# Patient Record
Sex: FEMALE | Race: WHITE | NOT HISPANIC OR LATINO | URBAN - METROPOLITAN AREA
[De-identification: names, ages, dates, MRNs, and addresses within clinical notes are randomized per-mention and may not be internally consistent; named-entity substitution may affect disease eponyms.]

---

## 2019-07-18 ENCOUNTER — EMERGENCY (EMERGENCY)
Facility: HOSPITAL | Age: 34
LOS: 1 days | Discharge: OTHER INSTITUTION | End: 2019-07-18
Attending: EMERGENCY MEDICINE
Payer: MEDICARE

## 2019-07-18 VITALS
HEART RATE: 72 BPM | RESPIRATION RATE: 18 BRPM | WEIGHT: 125 LBS | TEMPERATURE: 98 F | OXYGEN SATURATION: 98 % | HEIGHT: 66 IN | SYSTOLIC BLOOD PRESSURE: 105 MMHG | DIASTOLIC BLOOD PRESSURE: 68 MMHG

## 2019-07-18 LAB
ALBUMIN SERPL ELPH-MCNC: 4.5 G/DL — SIGNIFICANT CHANGE UP (ref 3.3–5.2)
ALP SERPL-CCNC: 54 U/L — SIGNIFICANT CHANGE UP (ref 40–120)
ALT FLD-CCNC: 15 U/L — SIGNIFICANT CHANGE UP
ANION GAP SERPL CALC-SCNC: 8 MMOL/L — SIGNIFICANT CHANGE UP (ref 5–17)
APAP SERPL-MCNC: <7.5 UG/ML — LOW (ref 10–26)
AST SERPL-CCNC: 25 U/L — SIGNIFICANT CHANGE UP
BASOPHILS # BLD AUTO: 0.04 K/UL — SIGNIFICANT CHANGE UP (ref 0–0.2)
BASOPHILS NFR BLD AUTO: 0.5 % — SIGNIFICANT CHANGE UP (ref 0–2)
BILIRUB SERPL-MCNC: 0.2 MG/DL — LOW (ref 0.4–2)
BUN SERPL-MCNC: 16 MG/DL — SIGNIFICANT CHANGE UP (ref 8–20)
CALCIUM SERPL-MCNC: 9.1 MG/DL — SIGNIFICANT CHANGE UP (ref 8.6–10.2)
CHLORIDE SERPL-SCNC: 106 MMOL/L — SIGNIFICANT CHANGE UP (ref 98–107)
CO2 SERPL-SCNC: 26 MMOL/L — SIGNIFICANT CHANGE UP (ref 22–29)
CREAT SERPL-MCNC: 0.72 MG/DL — SIGNIFICANT CHANGE UP (ref 0.5–1.3)
EOSINOPHIL # BLD AUTO: 0.07 K/UL — SIGNIFICANT CHANGE UP (ref 0–0.5)
EOSINOPHIL NFR BLD AUTO: 0.8 % — SIGNIFICANT CHANGE UP (ref 0–6)
ETHANOL SERPL-MCNC: <10 MG/DL — SIGNIFICANT CHANGE UP
GLUCOSE SERPL-MCNC: 82 MG/DL — SIGNIFICANT CHANGE UP (ref 70–115)
HCT VFR BLD CALC: 33.3 % — LOW (ref 34.5–45)
HGB BLD-MCNC: 10.5 G/DL — LOW (ref 11.5–15.5)
IMM GRANULOCYTES NFR BLD AUTO: 0.6 % — SIGNIFICANT CHANGE UP (ref 0–1.5)
LYMPHOCYTES # BLD AUTO: 1.01 K/UL — SIGNIFICANT CHANGE UP (ref 1–3.3)
LYMPHOCYTES # BLD AUTO: 12 % — LOW (ref 13–44)
MCHC RBC-ENTMCNC: 30.3 PG — SIGNIFICANT CHANGE UP (ref 27–34)
MCHC RBC-ENTMCNC: 31.5 GM/DL — LOW (ref 32–36)
MCV RBC AUTO: 96.2 FL — SIGNIFICANT CHANGE UP (ref 80–100)
MONOCYTES # BLD AUTO: 0.61 K/UL — SIGNIFICANT CHANGE UP (ref 0–0.9)
MONOCYTES NFR BLD AUTO: 7.2 % — SIGNIFICANT CHANGE UP (ref 2–14)
NEUTROPHILS # BLD AUTO: 6.64 K/UL — SIGNIFICANT CHANGE UP (ref 1.8–7.4)
NEUTROPHILS NFR BLD AUTO: 78.9 % — HIGH (ref 43–77)
PLATELET # BLD AUTO: 206 K/UL — SIGNIFICANT CHANGE UP (ref 150–400)
POTASSIUM SERPL-MCNC: 4.4 MMOL/L — SIGNIFICANT CHANGE UP (ref 3.5–5.3)
POTASSIUM SERPL-SCNC: 4.4 MMOL/L — SIGNIFICANT CHANGE UP (ref 3.5–5.3)
PROT SERPL-MCNC: 7 G/DL — SIGNIFICANT CHANGE UP (ref 6.6–8.7)
RBC # BLD: 3.46 M/UL — LOW (ref 3.8–5.2)
RBC # FLD: 11.9 % — SIGNIFICANT CHANGE UP (ref 10.3–14.5)
SALICYLATES SERPL-MCNC: <0.6 MG/DL — LOW (ref 10–20)
SODIUM SERPL-SCNC: 140 MMOL/L — SIGNIFICANT CHANGE UP (ref 135–145)
TSH SERPL-MCNC: 0.71 UIU/ML — SIGNIFICANT CHANGE UP (ref 0.27–4.2)
WBC # BLD: 8.42 K/UL — SIGNIFICANT CHANGE UP (ref 3.8–10.5)
WBC # FLD AUTO: 8.42 K/UL — SIGNIFICANT CHANGE UP (ref 3.8–10.5)

## 2019-07-18 PROCEDURE — 93010 ELECTROCARDIOGRAM REPORT: CPT

## 2019-07-18 PROCEDURE — 99285 EMERGENCY DEPT VISIT HI MDM: CPT

## 2019-07-18 NOTE — ED PROVIDER NOTE - CHPI ED SYMPTOMS NEG
no hematuria/no chills/no fever/chest pain, urinary symptoms, vaginal bleeding, vaginal discharge/no vomiting
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
## 2019-07-18 NOTE — ED PROVIDER NOTE - CLINICAL SUMMARY MEDICAL DECISION MAKING FREE TEXT BOX
34 y/o F 32 y/o F on Seroquel, Xanax, Ambien, Pain management opiate, hx of chronic constipation with recent behavioral changes at home. EMS activated by family, plan to get Psych Consult, labs, reevaluate.

## 2019-07-18 NOTE — ED PROVIDER NOTE - PHYSICAL EXAMINATION
Gen: NAD, AOx3  Head: NCAT  HEENT: PERRL, EOMI, oral mucosa moist, normal conjunctiva, neck supple  Lung: CTAB, no respiratory distress  CV: rrr, no murmur, Normal perfusion  Abd: soft, NTND, no CVA tenderness  MSK: No edema, no visible deformities  Neuro: No focal neurologic deficits  Skin: No rash   Psych: normal affect Constitutional : Appears comfortably, talking in full sentences, pale appearing. Pt is moving swiftly on stretcher, does not appear in any distress or pain.  Head :NC AT , no swelling  Eyes :eomi, no swelling  Mouth :mm dry,  Neck : supple, trachea in midline  Chest :Braydon air entry, symm chest expansion, no distress  Heart :S1 S2 distant  Abdomen :abd soft, non tender.   Lower back surgical scars, old and healed  Musc/Skel :ext no swelling, no deformity, no spine tenderness, distal pulses present  Neuro  :AAO 3 no focal deficits  Psych: Makes eye contact, follows command, no SI/HI, appears emotionally upset
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
## 2019-07-18 NOTE — ED PROVIDER NOTE - NS ED ROS FT
no weight change, no fever or chills  no recent travel, no recent abox, no sick contacts  no recent change in medications  no rash, no bruises  no visual changes no eye discharge  no cough cold or congestion,   no sob, no chest pain  follows with cardiology  no stress test, no cath  no orthopnea, no pnd  no abd pain, no n/v/d  no endoscopy, no colonoscopy  no hematuria, no change in urinary habits  no joint pain, no deformity  no headache, no paresthesia no weight change, no fever or chills  no recent travel, no recent abox, no sick contacts  + recent change in medications  no rash, no bruises  no visual changes no eye discharge  no cough cold or congestion,   no sob, no chest pain  no orthopnea, no pnd  no abd pain, +n/d, no v  no hematuria, no change in urinary habits  no joint pain, no deformity  no headache, no paresthesia

## 2019-07-19 VITALS
HEART RATE: 96 BPM | RESPIRATION RATE: 18 BRPM | DIASTOLIC BLOOD PRESSURE: 51 MMHG | SYSTOLIC BLOOD PRESSURE: 96 MMHG | TEMPERATURE: 98 F | OXYGEN SATURATION: 100 %

## 2019-07-19 DIAGNOSIS — F60.3 BORDERLINE PERSONALITY DISORDER: ICD-10-CM

## 2019-07-19 DIAGNOSIS — F32.9 MAJOR DEPRESSIVE DISORDER, SINGLE EPISODE, UNSPECIFIED: ICD-10-CM

## 2019-07-19 LAB
AMPHET UR-MCNC: NEGATIVE — SIGNIFICANT CHANGE UP
APPEARANCE UR: ABNORMAL
BACTERIA # UR AUTO: ABNORMAL
BARBITURATES UR SCN-MCNC: NEGATIVE — SIGNIFICANT CHANGE UP
BENZODIAZ UR-MCNC: POSITIVE
BILIRUB UR-MCNC: NEGATIVE — SIGNIFICANT CHANGE UP
COCAINE METAB.OTHER UR-MCNC: NEGATIVE — SIGNIFICANT CHANGE UP
COLOR SPEC: YELLOW — SIGNIFICANT CHANGE UP
COMMENT - URINE: SIGNIFICANT CHANGE UP
DIFF PNL FLD: NEGATIVE — SIGNIFICANT CHANGE UP
EPI CELLS # UR: SIGNIFICANT CHANGE UP
GLUCOSE UR QL: NEGATIVE MG/DL — SIGNIFICANT CHANGE UP
KETONES UR-MCNC: NEGATIVE — SIGNIFICANT CHANGE UP
LEUKOCYTE ESTERASE UR-ACNC: NEGATIVE — SIGNIFICANT CHANGE UP
METHADONE UR-MCNC: NEGATIVE — SIGNIFICANT CHANGE UP
NITRITE UR-MCNC: NEGATIVE — SIGNIFICANT CHANGE UP
OPIATES UR-MCNC: POSITIVE
PCP SPEC-MCNC: SIGNIFICANT CHANGE UP
PCP UR-MCNC: NEGATIVE — SIGNIFICANT CHANGE UP
PH UR: 8 — SIGNIFICANT CHANGE UP (ref 5–8)
PROT UR-MCNC: NEGATIVE MG/DL — SIGNIFICANT CHANGE UP
RBC CASTS # UR COMP ASSIST: SIGNIFICANT CHANGE UP /HPF (ref 0–4)
SP GR SPEC: 1.01 — SIGNIFICANT CHANGE UP (ref 1.01–1.02)
THC UR QL: POSITIVE
UROBILINOGEN FLD QL: NEGATIVE MG/DL — SIGNIFICANT CHANGE UP
WBC UR QL: SIGNIFICANT CHANGE UP

## 2019-07-19 PROCEDURE — 81001 URINALYSIS AUTO W/SCOPE: CPT

## 2019-07-19 PROCEDURE — 84443 ASSAY THYROID STIM HORMONE: CPT

## 2019-07-19 PROCEDURE — 36415 COLL VENOUS BLD VENIPUNCTURE: CPT

## 2019-07-19 PROCEDURE — 85027 COMPLETE CBC AUTOMATED: CPT

## 2019-07-19 PROCEDURE — 90792 PSYCH DIAG EVAL W/MED SRVCS: CPT | Mod: GT

## 2019-07-19 PROCEDURE — 80053 COMPREHEN METABOLIC PANEL: CPT

## 2019-07-19 PROCEDURE — 99285 EMERGENCY DEPT VISIT HI MDM: CPT

## 2019-07-19 PROCEDURE — 80307 DRUG TEST PRSMV CHEM ANLYZR: CPT

## 2019-07-19 PROCEDURE — 93005 ELECTROCARDIOGRAM TRACING: CPT

## 2019-07-19 RX ORDER — HYDROMORPHONE HYDROCHLORIDE 2 MG/ML
4 INJECTION INTRAMUSCULAR; INTRAVENOUS; SUBCUTANEOUS ONCE
Refills: 0 | Status: DISCONTINUED | OUTPATIENT
Start: 2019-07-19 | End: 2019-07-19

## 2019-07-19 RX ORDER — QUETIAPINE FUMARATE 200 MG/1
300 TABLET, FILM COATED ORAL ONCE
Refills: 0 | Status: COMPLETED | OUTPATIENT
Start: 2019-07-19 | End: 2019-07-19

## 2019-07-19 RX ADMIN — QUETIAPINE FUMARATE 300 MILLIGRAM(S): 200 TABLET, FILM COATED ORAL at 05:40

## 2019-07-19 RX ADMIN — HYDROMORPHONE HYDROCHLORIDE 4 MILLIGRAM(S): 2 INJECTION INTRAMUSCULAR; INTRAVENOUS; SUBCUTANEOUS at 10:45
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
## 2019-07-19 NOTE — CHART NOTE - NSCHARTNOTEFT_GEN_A_CORE
Social work note -  attempted to obtain auth from [employer] insurance as listed on pt's EMR. Worker spoke to Brenda who states pt's primary insurance is Medicare and we do not need to obtain auth. Information was given to SOFRANCISCO JAVIER.
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
## 2019-07-19 NOTE — ED BEHAVIORAL HEALTH ASSESSMENT NOTE - CURRENT MEDICATION
Per pt; Rx of Seroquel 600MG qhs, but pt has only been taking 300-400MG instead for several months because felt it was too sedating. Xanax 0.5MG BID, Ambien 10MG qhs, MS Contin 100MG BID, Roxicodone 30MG TID, Dilaudid 8MG BID, Muscle relaxers; Xaneplan 8MG PRN, Baclofen 10MG PRN, Topomate 25MG, release 500MG? or 800MG??, Medical marijuana, zofran 8MG PRN, Lactulose, Ralstor.  Istop#[id number]; zolpidem tartrate 10 mg tablet, alprazolam 0.5 mg tablet BID, blue hybrid ef90 9mg thc and <0.5mg cbd per 0.01ml vape, morphine sulf er 100 mg tablet BID, roxicodone 30 mg tablet TID, hydromorphone 8 mg tablet BID.
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
## 2019-07-19 NOTE — ED BEHAVIORAL HEALTH ASSESSMENT NOTE - PSYCHIATRIC ISSUES AND PLAN (INCLUDE STANDING AND PRN MEDICATION)
can continue home psych medications. can consider following PRN; Offer PO first, Haldol 2MG PO q6h PRN agitation, Ativan 1MG PRN agitation, Benadryl 50MG PRN EPS prevention. Can utilize IM if benefit outweigh risks and pt refuses PO. Lower dose given hx of EPS.

## 2019-07-19 NOTE — ED BEHAVIORAL HEALTH NOTE - BEHAVIORAL HEALTH NOTE
Social Work Note: This writer confirmed acceptance at [facility], spoke with Reuben in Admissions. This writer scheduled ambulance with [facility] for transfer.

## 2019-07-19 NOTE — ED ADULT NURSE REASSESSMENT NOTE - NS ED NURSE REASSESS COMMENT FT1
Handoff report given to RN Amanda in .  Pt escorted to  by [facility].
legal papers signed by ,keylaxed to tele. nursing supervisor was notify signature needed . telepysch call informed  awaiting nursing supervisor.
marcelo completed pt c/o pain requesting medication spoke with dr taveras no orders received at this time
pt upset sitting in day room
supervisor recalled for signature on legal paper awaiting arrival
telepysch in progress
teletia called  requesting supervisor document made aware still awaiting supervisor for signature.   nsg supervisor called will be down shortly to sign
oriented to unit and surroundings made comfortable plan of care explained

## 2019-07-19 NOTE — ED BEHAVIORAL HEALTH ASSESSMENT NOTE - LEGAL HISTORY
court case still open since december, per pt due to medical marijuana. 10 years ago due to DWAI because of meds
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
## 2019-07-19 NOTE — ED BEHAVIORAL HEALTH ASSESSMENT NOTE - AFFECT QUALITY
Patient was not available for their therapy session at this time.  Reason not seen: Patient requesting no therapy today (09/26/18 1208). Pt stated, \"I'm trying to catch up on sleep. I've also been vomiting and I'm trying to fight that off.\" Re-Attempt Plan: Will re-attempt per established treatment plan (09/26/18 1208).     Anxious/Irritable
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
## 2019-07-19 NOTE — ED BEHAVIORAL HEALTH ASSESSMENT NOTE - MEDICAL ISSUES AND PLAN (INCLUDE STANDING AND PRN MEDICATION)
defer to ER doctor; please rule out any concerning organic reasons for loss of bowel control and give recommendations regarding medical medications defer to ER doctor

## 2019-07-19 NOTE — ED BEHAVIORAL HEALTH ASSESSMENT NOTE - PAST PSYCHOTROPIC MEDICATION
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   current pharmacy is [place] at [facility].
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
## 2019-07-19 NOTE — ED BEHAVIORAL HEALTH ASSESSMENT NOTE - AXIS IV
Housing problems/Economic problems/Problem related to social environment/Educational problems/Occupational problems/Problems with primary support/Problems with interaction with legal system
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
## 2019-07-19 NOTE — ED ADULT NURSE REASSESSMENT NOTE - CONDITION
unchanged
unchanged/Pt asleep on initial rounds. Refused breakfast. provided a list of her medications and stated her pharmacy was [facility] in [place]. Unable to verify meds she provided. Call to Dr. Taylor. Dilauded 4mg given. Pt then stated [facility] was closed and she received meds from Pharmacy in [facility].. Meds verified but different dosage. . Pt continued to rest in bed comfortably. Reports pain as 8/10
unchanged
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
## 2019-07-19 NOTE — ED BEHAVIORAL HEALTH ASSESSMENT NOTE - OTHER PAST PSYCHIATRIC HISTORY (INCLUDE DETAILS REGARDING ONSET, COURSE OF ILLNESS, INPATIENT/OUTPATIENT TREATMENT)
see HPI.   Dx BPD. 5-6 hospitalizations for behavioral issues. ER visits, last August 2018 at [place].   In outpatient with psychiatrist Dr. Willy Mcgee and therapist Edenilson Lu.  Attempted to call Dr. Mcgee for collateral-goes to voicemail, [facility] left voicemail.

## 2021-01-25 PROBLEM — F60.3 BORDERLINE PERSONALITY DISORDER: Chronic | Status: ACTIVE | Noted: 2019-07-26

## 2021-03-16 ENCOUNTER — APPOINTMENT (OUTPATIENT)
Dept: GASTROENTEROLOGY | Facility: CLINIC | Age: 36
End: 2021-03-16

## 2021-04-30 NOTE — ED BEHAVIORAL HEALTH ASSESSMENT NOTE - DESCRIPTION (FIRST USE, LAST USE, QUANTITY, FREQUENCY, DURATION)
Crease phentermine to 30 mg increase exercise manage diet  Take Diflucan today then next dose on Monday   Use Lotrisone cream to the affected area twice a day for a week    Weight Management   WHAT YOU NEED TO KNOW:   Being overweight increases your risk of health conditions such as heart disease, high blood pressure, type 2 diabetes, and certain types of cancer  It can also increase your risk for osteoarthritis, sleep apnea, and other respiratory problems  Aim for a slow, steady weight loss  Even a small amount of weight loss can lower your risk of health problems  DISCHARGE INSTRUCTIONS:   How to lose weight safely:  A safe and healthy way to lose weight is to eat fewer calories and get regular exercise  · You can lose up about 1 pound a week by decreasing the number of calories you eat by 500 calories each day  You can decrease calories by eating smaller portion sizes or by cutting out high-calorie foods  Read labels to find out how many calories are in the foods you eat  · You can also burn calories with exercise such as walking, swimming, or biking  You will be more likely to keep weight off if you make these changes part of your lifestyle  Exercise at least 30 minutes per day on most days of the week  You can also fit in more physical activity by taking the stairs instead of the elevator or parking farther away from stores  Ask your healthcare provider about the best exercise plan for you  Healthy meal plan for weight management:  A healthy meal plan includes a variety of foods, contains fewer calories, and helps you stay healthy  A healthy meal plan includes the following:     · Eat whole-grain foods more often  A healthy meal plan should contain fiber  Fiber is the part of grains, fruits, and vegetables that is not broken down by your body  Whole-grain foods are healthy and provide extra fiber in your diet   Some examples of whole-grain foods are whole-wheat breads and pastas, oatmeal, [employer] rice, and bulgur  · Eat a variety of vegetables every day  Include dark, leafy greens such as spinach, kale, nayana greens, and mustard greens  Eat yellow and orange vegetables such as carrots, sweet potatoes, and winter squash  · Eat a variety of fruits every day  Choose fresh or canned fruit (canned in its own juice or light syrup) instead of juice  Fruit juice has very little or no fiber  · Eat low-fat dairy foods  Drink fat-free (skim) milk or 1% milk  Eat fat-free yogurt and low-fat cottage cheese  Try low-fat cheeses such as mozzarella and other reduced-fat cheeses  · Choose meat and other protein foods that are low in fat  Choose beans or other legumes such as split peas or lentils  Choose fish, skinless poultry (chicken or turkey), or lean cuts of red meat (beef or pork)  Before you cook meat or poultry, cut off any visible fat  · Use less fat and oil  Try baking foods instead of frying them  Add less fat, such as margarine, sour cream, regular salad dressing, and mayonnaise to foods  Eat fewer high-fat foods  Some examples of high-fat foods include french fries, doughnuts, ice cream, and cakes  · Eat fewer sweets  Limit foods and drinks that are high in sugar  This includes candy, cookies, regular soda, and sweetened drinks  Ways to decrease calories:   · Eat smaller portions  ? Use a small plate with smaller servings  ? Do not eat second helpings  ? When you eat at a restaurant, ask for a box and place half of your meal in the box before you eat  ? Share an entrée with someone else  · Replace high-calorie snacks with healthy, low-calorie snacks  ? Choose fresh fruit, vegetables, fat-free rice cakes, or air-popped popcorn instead of potato chips, nuts, or chocolate  ? Choose water or calorie-free drinks instead of soda or sweetened drinks  · Do not shop for groceries when you are hungry  You may be more likely to make unhealthy food choices   Take a grocery list of healthy foods and shop after you have eaten  · Eat regular meals  Do not skip meals  Skipping meals can lead to overeating later in the day  This can make it harder for you to lose weight  Eat a healthy snack in place of a meal if you do not have time to eat a regular meal  Talk with a dietitian to help you create a meal plan and schedule that is right for you  Other things to consider as you try to lose weight:   · Be aware of situations that may give you the urge to overeat, such as eating while watching television  Find ways to avoid these situations  For example, read a book, go for a walk, or do crafts  · Meet with a weight loss support group or friends who are also trying to lose weight  This may help you stay motivated to continue working on your weight loss goals  © Copyright [address] Information is for End User's use only and may not be sold, redistributed or otherwise used for commercial purposes  All illustrations and images included in CareNotes® are the copyrighted property of A D A M , Inc  or [facility] Samuel Parker   The above information is an  only  It is not intended as medical advice for individual conditions or treatments  Talk to your doctor, nurse or pharmacist before following any medical regimen to see if it is safe and effective for you  per mother pt used cocaine in past, but pt denies

## 2021-06-28 ENCOUNTER — APPOINTMENT (OUTPATIENT)
Dept: DERMATOLOGY | Facility: CLINIC | Age: 36
End: 2021-06-28
Payer: MEDICARE

## 2021-06-28 PROCEDURE — 99205 OFFICE O/P NEW HI 60 MIN: CPT | Mod: 25

## 2021-06-28 PROCEDURE — 11900 INJECT SKIN LESIONS </W 7: CPT

## 2021-07-14 ENCOUNTER — APPOINTMENT (OUTPATIENT)
Dept: DERMATOLOGY | Facility: CLINIC | Age: 36
End: 2021-07-14
Payer: MEDICARE

## 2021-07-14 PROCEDURE — 99213 OFFICE O/P EST LOW 20 MIN: CPT | Mod: 25

## 2021-07-14 PROCEDURE — 11900 INJECT SKIN LESIONS </W 7: CPT

## 2021-07-22 ENCOUNTER — APPOINTMENT (OUTPATIENT)
Dept: DERMATOLOGY | Facility: CLINIC | Age: 36
End: 2021-07-22
Payer: MEDICARE

## 2021-07-22 PROCEDURE — 11900 INJECT SKIN LESIONS </W 7: CPT

## 2021-07-22 PROCEDURE — 99214 OFFICE O/P EST MOD 30 MIN: CPT | Mod: 25

## 2021-07-28 ENCOUNTER — APPOINTMENT (OUTPATIENT)
Dept: DERMATOLOGY | Facility: CLINIC | Age: 36
End: 2021-07-28
Payer: MEDICARE

## 2021-07-28 PROCEDURE — 11900 INJECT SKIN LESIONS </W 7: CPT

## 2021-07-28 PROCEDURE — 99213 OFFICE O/P EST LOW 20 MIN: CPT | Mod: 25

## 2021-08-12 ENCOUNTER — APPOINTMENT (OUTPATIENT)
Dept: DERMATOLOGY | Facility: CLINIC | Age: 36
End: 2021-08-12
Payer: MEDICARE

## 2021-08-12 PROCEDURE — 11900 INJECT SKIN LESIONS </W 7: CPT

## 2021-08-12 PROCEDURE — 99213 OFFICE O/P EST LOW 20 MIN: CPT | Mod: 25

## 2021-09-02 ENCOUNTER — APPOINTMENT (OUTPATIENT)
Dept: DERMATOLOGY | Facility: CLINIC | Age: 36
End: 2021-09-02

## 2021-09-07 ENCOUNTER — APPOINTMENT (OUTPATIENT)
Dept: DERMATOLOGY | Facility: CLINIC | Age: 36
End: 2021-09-07
Payer: MEDICARE

## 2021-09-07 PROCEDURE — 11900 INJECT SKIN LESIONS </W 7: CPT

## 2021-09-16 ENCOUNTER — APPOINTMENT (OUTPATIENT)
Dept: DERMATOLOGY | Facility: CLINIC | Age: 36
End: 2021-09-16

## 2021-10-05 ENCOUNTER — APPOINTMENT (OUTPATIENT)
Dept: DERMATOLOGY | Facility: CLINIC | Age: 36
End: 2021-10-05
Payer: MEDICARE

## 2021-10-05 PROCEDURE — 99213 OFFICE O/P EST LOW 20 MIN: CPT | Mod: 25

## 2021-10-05 PROCEDURE — 11900 INJECT SKIN LESIONS </W 7: CPT

## 2021-10-12 ENCOUNTER — TRANSCRIPTION ENCOUNTER (OUTPATIENT)
Age: 36
End: 2021-10-12

## 2021-11-16 ENCOUNTER — APPOINTMENT (OUTPATIENT)
Dept: DERMATOLOGY | Facility: CLINIC | Age: 36
End: 2021-11-16
Payer: MEDICARE

## 2021-11-16 PROCEDURE — 11900 INJECT SKIN LESIONS </W 7: CPT

## 2021-11-16 PROCEDURE — 99213 OFFICE O/P EST LOW 20 MIN: CPT | Mod: 25

## 2021-11-30 ENCOUNTER — APPOINTMENT (OUTPATIENT)
Dept: DERMATOLOGY | Facility: CLINIC | Age: 36
End: 2021-11-30
Payer: MEDICARE

## 2021-11-30 PROCEDURE — 11900 INJECT SKIN LESIONS </W 7: CPT

## 2021-11-30 PROCEDURE — 99213 OFFICE O/P EST LOW 20 MIN: CPT | Mod: 25

## 2022-01-10 ENCOUNTER — APPOINTMENT (OUTPATIENT)
Dept: DERMATOLOGY | Facility: CLINIC | Age: 37
End: 2022-01-10
Payer: MEDICARE

## 2022-01-10 PROCEDURE — 99213 OFFICE O/P EST LOW 20 MIN: CPT | Mod: 25

## 2022-01-10 PROCEDURE — 11900 INJECT SKIN LESIONS </W 7: CPT

## 2022-01-20 ENCOUNTER — APPOINTMENT (OUTPATIENT)
Dept: DERMATOLOGY | Facility: CLINIC | Age: 37
End: 2022-01-20
Payer: MEDICARE

## 2022-01-20 PROCEDURE — 11900 INJECT SKIN LESIONS </W 7: CPT

## 2022-01-20 PROCEDURE — 99214 OFFICE O/P EST MOD 30 MIN: CPT | Mod: 25

## 2022-02-07 ENCOUNTER — APPOINTMENT (OUTPATIENT)
Dept: DERMATOLOGY | Facility: CLINIC | Age: 37
End: 2022-02-07
Payer: MEDICARE

## 2022-02-07 PROCEDURE — 11900 INJECT SKIN LESIONS </W 7: CPT

## 2022-02-07 PROCEDURE — 99213 OFFICE O/P EST LOW 20 MIN: CPT | Mod: 25

## 2022-03-10 ENCOUNTER — APPOINTMENT (OUTPATIENT)
Dept: DERMATOLOGY | Facility: CLINIC | Age: 37
End: 2022-03-10

## 2023-01-04 ENCOUNTER — APPOINTMENT (OUTPATIENT)
Dept: DERMATOLOGY | Facility: CLINIC | Age: 38
End: 2023-01-04

## 2023-01-06 ENCOUNTER — APPOINTMENT (OUTPATIENT)
Dept: DERMATOLOGY | Facility: CLINIC | Age: 38
End: 2023-01-06

## 2024-03-28 ENCOUNTER — OUTPATIENT (OUTPATIENT)
Dept: OUTPATIENT SERVICES | Facility: HOSPITAL | Age: 39
LOS: 1 days | End: 2024-03-28
Payer: MEDICARE

## 2024-03-28 VITALS
RESPIRATION RATE: 15 BRPM | OXYGEN SATURATION: 100 % | WEIGHT: 126.1 LBS | TEMPERATURE: 98 F | HEIGHT: 66 IN | HEART RATE: 67 BPM | SYSTOLIC BLOOD PRESSURE: 100 MMHG | DIASTOLIC BLOOD PRESSURE: 57 MMHG

## 2024-03-28 DIAGNOSIS — S31.000A UNSPECIFIED OPEN WOUND OF LOWER BACK AND PELVIS WITHOUT PENETRATION INTO RETROPERITONEUM, INITIAL ENCOUNTER: ICD-10-CM

## 2024-03-28 DIAGNOSIS — Z01.818 ENCOUNTER FOR OTHER PREPROCEDURAL EXAMINATION: ICD-10-CM

## 2024-03-28 DIAGNOSIS — Z98.890 OTHER SPECIFIED POSTPROCEDURAL STATES: Chronic | ICD-10-CM
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
## 2024-03-28 DIAGNOSIS — M48.062 SPINAL STENOSIS, LUMBAR REGION WITH NEUROGENIC CLAUDICATION: ICD-10-CM

## 2024-03-28 DIAGNOSIS — Z98.1 ARTHRODESIS STATUS: Chronic | ICD-10-CM

## 2024-03-28 LAB
ABO RH CONFIRMATION: SIGNIFICANT CHANGE UP
ALBUMIN SERPL ELPH-MCNC: 4.1 G/DL — SIGNIFICANT CHANGE UP (ref 3.3–5)
ALP SERPL-CCNC: 35 U/L — LOW (ref 40–120)
ALT FLD-CCNC: 77 U/L — SIGNIFICANT CHANGE UP (ref 12–78)
ANION GAP SERPL CALC-SCNC: 2 MMOL/L — LOW (ref 5–17)
APPEARANCE UR: CLEAR — SIGNIFICANT CHANGE UP
APTT BLD: 33.5 SEC — SIGNIFICANT CHANGE UP (ref 24.5–35.6)
AST SERPL-CCNC: 34 U/L — SIGNIFICANT CHANGE UP (ref 15–37)
BASOPHILS # BLD AUTO: 0.03 K/UL — SIGNIFICANT CHANGE UP (ref 0–0.2)
BASOPHILS NFR BLD AUTO: 0.8 % — SIGNIFICANT CHANGE UP (ref 0–2)
BILIRUB SERPL-MCNC: 0.2 MG/DL — SIGNIFICANT CHANGE UP (ref 0.2–1.2)
BILIRUB UR-MCNC: NEGATIVE — SIGNIFICANT CHANGE UP
BLD GP AB SCN SERPL QL: SIGNIFICANT CHANGE UP
BUN SERPL-MCNC: 14 MG/DL — SIGNIFICANT CHANGE UP (ref 7–23)
CALCIUM SERPL-MCNC: 8.9 MG/DL — SIGNIFICANT CHANGE UP (ref 8.5–10.1)
CHLORIDE SERPL-SCNC: 108 MMOL/L — SIGNIFICANT CHANGE UP (ref 96–108)
CO2 SERPL-SCNC: 28 MMOL/L — SIGNIFICANT CHANGE UP (ref 22–31)
COLOR SPEC: YELLOW — SIGNIFICANT CHANGE UP
CREAT SERPL-MCNC: 0.73 MG/DL — SIGNIFICANT CHANGE UP (ref 0.5–1.3)
DIFF PNL FLD: NEGATIVE — SIGNIFICANT CHANGE UP
EGFR: 109 ML/MIN/1.73M2 — SIGNIFICANT CHANGE UP
EOSINOPHIL # BLD AUTO: 0.07 K/UL — SIGNIFICANT CHANGE UP (ref 0–0.5)
EOSINOPHIL NFR BLD AUTO: 1.9 % — SIGNIFICANT CHANGE UP (ref 0–6)
GLUCOSE SERPL-MCNC: 99 MG/DL — SIGNIFICANT CHANGE UP (ref 70–99)
GLUCOSE UR QL: NEGATIVE MG/DL — SIGNIFICANT CHANGE UP
HCT VFR BLD CALC: 32.8 % — LOW (ref 34.5–45)
HGB BLD-MCNC: 10.5 G/DL — LOW (ref 11.5–15.5)
IMM GRANULOCYTES NFR BLD AUTO: 0.3 % — SIGNIFICANT CHANGE UP (ref 0–0.9)
INR BLD: 0.92 RATIO — SIGNIFICANT CHANGE UP (ref 0.85–1.18)
KETONES UR-MCNC: NEGATIVE MG/DL — SIGNIFICANT CHANGE UP
LEUKOCYTE ESTERASE UR-ACNC: NEGATIVE — SIGNIFICANT CHANGE UP
LYMPHOCYTES # BLD AUTO: 0.98 K/UL — LOW (ref 1–3.3)
LYMPHOCYTES # BLD AUTO: 26.9 % — SIGNIFICANT CHANGE UP (ref 13–44)
MCHC RBC-ENTMCNC: 30.6 PG — SIGNIFICANT CHANGE UP (ref 27–34)
MCHC RBC-ENTMCNC: 32 GM/DL — SIGNIFICANT CHANGE UP (ref 32–36)
MCV RBC AUTO: 95.6 FL — SIGNIFICANT CHANGE UP (ref 80–100)
MONOCYTES # BLD AUTO: 0.26 K/UL — SIGNIFICANT CHANGE UP (ref 0–0.9)
MONOCYTES NFR BLD AUTO: 7.1 % — SIGNIFICANT CHANGE UP (ref 2–14)
NEUTROPHILS # BLD AUTO: 2.29 K/UL — SIGNIFICANT CHANGE UP (ref 1.8–7.4)
NEUTROPHILS NFR BLD AUTO: 63 % — SIGNIFICANT CHANGE UP (ref 43–77)
NITRITE UR-MCNC: NEGATIVE — SIGNIFICANT CHANGE UP
PH UR: 7 — SIGNIFICANT CHANGE UP (ref 5–8)
PLATELET # BLD AUTO: 219 K/UL — SIGNIFICANT CHANGE UP (ref 150–400)
POTASSIUM SERPL-MCNC: 4.3 MMOL/L — SIGNIFICANT CHANGE UP (ref 3.5–5.3)
POTASSIUM SERPL-SCNC: 4.3 MMOL/L — SIGNIFICANT CHANGE UP (ref 3.5–5.3)
PROT SERPL-MCNC: 6.6 GM/DL — SIGNIFICANT CHANGE UP (ref 6–8.3)
PROT UR-MCNC: NEGATIVE MG/DL — SIGNIFICANT CHANGE UP
PROTHROM AB SERPL-ACNC: 10.4 SEC — SIGNIFICANT CHANGE UP (ref 9.5–13)
RBC # BLD: 3.43 M/UL — LOW (ref 3.8–5.2)
RBC # FLD: 12.5 % — SIGNIFICANT CHANGE UP (ref 10.3–14.5)
SODIUM SERPL-SCNC: 138 MMOL/L — SIGNIFICANT CHANGE UP (ref 135–145)
SP GR SPEC: 1.01 — SIGNIFICANT CHANGE UP (ref 1–1.03)
UROBILINOGEN FLD QL: 0.2 MG/DL — SIGNIFICANT CHANGE UP (ref 0.2–1)
WBC # BLD: 3.64 K/UL — LOW (ref 3.8–10.5)
WBC # FLD AUTO: 3.64 K/UL — LOW (ref 3.8–10.5)

## 2024-03-28 PROCEDURE — 83036 HEMOGLOBIN GLYCOSYLATED A1C: CPT

## 2024-03-28 PROCEDURE — 36415 COLL VENOUS BLD VENIPUNCTURE: CPT

## 2024-03-28 PROCEDURE — 80053 COMPREHEN METABOLIC PANEL: CPT

## 2024-03-28 PROCEDURE — 86850 RBC ANTIBODY SCREEN: CPT

## 2024-03-28 PROCEDURE — 93010 ELECTROCARDIOGRAM REPORT: CPT

## 2024-03-28 PROCEDURE — 86900 BLOOD TYPING SEROLOGIC ABO: CPT

## 2024-03-28 PROCEDURE — 93005 ELECTROCARDIOGRAM TRACING: CPT

## 2024-03-28 PROCEDURE — 86901 BLOOD TYPING SEROLOGIC RH(D): CPT

## 2024-03-28 PROCEDURE — 71046 X-RAY EXAM CHEST 2 VIEWS: CPT | Mod: 26

## 2024-03-28 PROCEDURE — 85610 PROTHROMBIN TIME: CPT

## 2024-03-28 PROCEDURE — 87086 URINE CULTURE/COLONY COUNT: CPT

## 2024-03-28 PROCEDURE — 87640 STAPH A DNA AMP PROBE: CPT

## 2024-03-28 PROCEDURE — 87186 SC STD MICRODIL/AGAR DIL: CPT

## 2024-03-28 PROCEDURE — 85025 COMPLETE CBC W/AUTO DIFF WBC: CPT

## 2024-03-28 PROCEDURE — 87641 MR-STAPH DNA AMP PROBE: CPT

## 2024-03-28 PROCEDURE — 99214 OFFICE O/P EST MOD 30 MIN: CPT | Mod: 25

## 2024-03-28 PROCEDURE — 85730 THROMBOPLASTIN TIME PARTIAL: CPT

## 2024-03-28 PROCEDURE — 81003 URINALYSIS AUTO W/O SCOPE: CPT

## 2024-03-28 PROCEDURE — 71046 X-RAY EXAM CHEST 2 VIEWS: CPT

## 2024-03-28 RX ORDER — MORPHINE SULFATE 50 MG/1
1 CAPSULE, EXTENDED RELEASE ORAL
Qty: 0 | Refills: 0 | DISCHARGE

## 2024-03-28 RX ORDER — OXYCODONE HYDROCHLORIDE 5 MG/1
0 TABLET ORAL
Qty: 0 | Refills: 0 | DISCHARGE

## 2024-03-28 RX ORDER — ALPRAZOLAM 0.25 MG
0 TABLET ORAL
Qty: 0 | Refills: 0 | DISCHARGE

## 2024-03-28 NOTE — H&P PST ADULT - SUBSTANCE USE COMMENT, PROFILE
Subjective: Wants to go home.    Review of Systems:   Constitutional:  Denies headache, fatigue, weight loss, weight gain, fevers, chills.  HEENT:  Denies vision changes, dry eye, or congestion.   Respiratory: (+) Exertional dyspnea. Denies shortness of breath at rest, denies cough, denies wheezing.  Cardiovascular:  Denies swelling, chest pain, palpitations, dizziness, and syncope.  Gastrointestinal:  Denies nausea, vomiting, diarrhea. Denies early satiety.   Genitourinary:  Denies dysuria.  Musculoskeletal:  Denies pain.      Objective: 12/26/23:  ST, SBP 100s, Bumex gtt 2mg/hr, Cr 1.2, net neg 165mL/24 hours with minimal urine output this morning, on HFNC 80%/60L, Dobutamine 2.5mcg, Remodulin 150ng/kg/min    12/25/23: HR 80s-90s, NSR, Bumex 2mg/hr, INR 1.1, BUN 32, Cr 1.16, Na 128, WBC 8.6, Hgb 11, Plt 2,  1.25 mcg/kg/min, Remodulin 150 ng/kg/min, vasopressin gtt, 60 L/min high flow NC, I/O +736.8    12/24/23: HR 90s, NSR, MAP 49-73, BUN 28, Cr 1.11, Na 127, WBC 9.7, Hgb 12.4, Plt 67, bumex 2mg/hr,  2.5 mcg/kg/min, remodulin 150 ng/kg/min, vasopressin gtt, high flow NC 60 L/min, I/O -1170.8    12/23/23: HR 80s-low 90s, MAP 70s, CVP 15, PAP 14/6/9, BUN 30, Cr 1.52, Na 127, WBC 7.4, Hgb 12.9, PLT 75, Net -1544 cc/24h, Bumex 2 mg/hr, Dobutamine 2.5 mcg/kg/min, Remodulin 150 ng/kg/min, Vasopressin gtt, NC 6 L/min    12/22/23: unable to obtain bedside PCWP today; AIBP 107/54/69  CVP 15 PA 89/39/58  CO 4 CI 2.4  SVR 1080  on 4lpm NC  bumex at 2/hr, dobs 2.5 mcg/kg/min; remodulin 150ng/kg/min + vasopressin  UOP last 24h 4.5L net neg 1.8L    12/21/23 Hemodynamics RAP 15, PAP 84/44/59, CO 4.4, CI 2.6 SVO 2 62%, Cr 1.93 (2.0 last night) HR 88, BP 98/52     12/20/23 s/p RHC, RAP 20, PAP 80/40/55, PCWP 14, CO 3.3 CI 1.9 by Becca, PA sat 61%    12/19/23 Examined while sitting in bed. On lasix 40mg/hr , net neg 1.7L/24 hours, Dobutamine 2.5mcg, Vaso 0.04, midodrine increased to 20mg q8h yesterday          VITAL SIGNS:     VITAL SIGNS:  Vital Last Value 24 Hour Range   Temperature 98.1 °F (36.7 °C) (12/26/23 0400) Temp  Min: 98.1 °F (36.7 °C)  Max: 99 °F (37.2 °C)   Pulse (!) 112 (12/26/23 0900) Pulse  Min: 94  Max: 116   Respiratory (!) 25 (12/26/23 0900) Resp  Min: 20  Max: 27   Non-Invasive  Blood Pressure 104/60 (12/23/23 2225) No data recorded   Pulse Oximetry 95 % (12/26/23 0900) SpO2  Min: 91 %  Max: 98 %     Vital Today Admitted   Weight 59.4 kg (130 lb 15.3 oz) (12/25/23 0000) Weight: 66.1 kg (145 lb 11.6 oz) (12/15/23 1237)   Height N/A Height: 5' 6\" (167.6 cm) (12/15/23 1237)   BMI N/A BMI (Calculated): 23.52 (12/15/23 1237)     Intake/Output    Intake/Output Summary (Last 24 hours) at 12/26/2023 1001  Last data filed at 12/26/2023 0900  Gross per 24 hour   Intake 1403.58 ml   Output 1280 ml   Net 123.58 ml            PHYSICAL EXAM:  General:  Alert and oriented.   Eye:  Pupils are equal, round and reactive. Vision grossly normal.    Neck:  JVP: elevated No lymphadenopathy.  Respiratory:  Lungs are clear to auscultation bilaterally, no crackles.  Cardiovascular:  Normal rate, Regular rhythm. 3+ bilateral radial pulses, 2+ bilateral dorsalis pedis. 2+ pitting BLE (bilateral lower extremities), improving   Gastrointestinal:  Soft, Non-tender.     Integumentary:  Warm, intact. No rash, no cyanosis, no clubbing of fingernails.   Psychiatric:  Flat affect       ACTIVE MEDS:  Current Facility-Administered Medications   Medication Dose Route Frequency Provider Last Rate Last Admin    tolvaptan (SAMSCA) tablet 15 mg  15 mg Oral Once Trinity Arriaza MD        vasopressin (VASOSTRICT) 20 unit/100 mL dextrose 5 % infusion  0-0.1 Units/min Intravenous Continuous Patrick Park MD   Completed at 12/26/23 0620    PHENYLephrine (COLE-SYNEPHRINE) 50 mg/250 mL sodium chloride 0.9 % infusion  0-100 mcg/min Intravenous Continuous Vivian, MD Patrick   Completed at 12/24/23 0961    HYDROmorphone (DILAUDID) injection  0.2 mg  0.2 mg Intravenous Q6H PRN Scornavacco, Lashawn N, APNP   0.2 mg at 12/25/23 0308    acetaminophen (TYLENOL) tablet 650 mg  650 mg Oral Q4H PRN Scornavacco, Lashawn N, APNP        Or    acetaminophen (TYLENOL) suppository 650 mg  650 mg Rectal Q4H PRN Scornavacco, Lashawn N, APNP        Or    HYDROcodone-acetaminophen (NORCO) 5-325 MG per tablet 1 tablet  1 tablet Oral Q6H PRN Scornavacco, Lashawn N, APNP   1 tablet at 12/25/23 1312    enoxaparin (LOVENOX) injection 40 mg  40 mg Subcutaneous Daily Scornavacco, Lashawn N, APNP   40 mg at 12/26/23 0852    [Held by provider] dexMEDEtomidine (PRECEDEX) 400 mcg/100 mL in sodium chloride 0.9 % infusion  0-1 mcg/kg/hr (Dosing Weight) Intravenous Continuous Zachery Domínguez MD   Completed at 12/23/23 1900    ondansetron (ZOFRAN) injection 4 mg  4 mg Intravenous Q6H PRN Patrick Park MD   4 mg at 12/23/23 1219    folic acid (FOLATE) tablet 5 mg  5 mg Oral Daily Doris Case MD   5 mg at 12/26/23 0851    cyanocobalamin (Vitamin B-12) tablet 500 mcg  500 mcg Oral Daily Doris Case MD   500 mcg at 12/26/23 0851    bumetanide (BUMEX) 12.5 mg/50 mL infusion adult or ped > 15 kg (0.25 mg/mL)  2 mg/hr Intravenous Continuous Roxanna Salazar CNP 8 mL/hr at 12/26/23 0911 2 mg/hr at 12/26/23 0911    sodium chloride 0.9% infusion   Intravenous Continuous PRN Patrick Park MD 6 mL/hr at 12/24/23 0200 Rate Verify at 12/24/23 0200    sodium chloride 0.9% infusion   Intravenous Continuous Patrick Park MD 10 mL/hr at 12/23/23 0900 Rate Verify at 12/23/23 0900    gabapentin (NEURONTIN) capsule 200 mg  200 mg Oral Q8H PRN Scornavacco, Lashawn N, APNP   200 mg at 12/24/23 1140    docusate sodium-sennosides (SENOKOT S) 50-8.6 MG 1 tablet  1 tablet Oral BID PRN Doris Case MD        melatonin tablet 3 mg  3 mg Oral Nightly PRN Doris Case MD        ferrous sulfate (65 mg Fe per 325 mg) tablet 325 mg  325 mg Oral BID WC Doris Case MD   325 mg at 12/26/23 5708    spironolactone  (ALDACTONE) tablet 25 mg  25 mg Oral Daily Patrick Park MD   25 mg at 12/26/23 0851    midodrine (PROAMATINE) tablet 20 mg  20 mg Oral 3 times per day Davide Kruger NP   20 mg at 12/26/23 0850    DOBUTamine (DOBUTREX) 500 mg/250 mL dextrose 5 % infusion  2.5 mcg/kg/min (Dosing Weight) Intravenous Continuous Patrick Park MD 5.3 mL/hr at 12/26/23 0911 2.5 mcg/kg/min at 12/26/23 0911    loperamide (IMODIUM) capsule 2 mg  2 mg Oral Q4H PRN Delia Valles CNP   2 mg at 12/24/23 1650    Potassium Standard Replacement Protocol (Levels 3.5 and lower)   Does not apply See Admin Instructions Delia Banks MD        Potassium Replacement (Levels 3.6 - 4)   Does not apply See Admin Instructions Delia Banks MD        Magnesium Standard Replacement Protocol   Does not apply See Admin Instructions Delia Banks MD        Phosphorus Standard Replacement Protocol   Does not apply See Admin Instructions Delia Banks MD        riociguat (ADEMPAS) tablet 2.5 mg  2.5 mg Oral 3 times per day Rosalia Owen CNP   2.5 mg at 12/26/23 0851    treprostinil (REMODULIN) 35 mg in sodium chloride 0.9 % 100 mL total volume IV infusion via CADD  150 ng/kg/min (Order-Specific) Intravenous Continuous Roxanna Salazar CNP 1.826 mL/hr at 12/26/23 0911 150 ng/kg/min at 12/26/23 0911         Dosing Weight:  Dosing Weight: 71 kg (156 lb 8.4 oz) (12/15/2023 12:37 PM)     PULMONARY HYPERTENSION DIAGNOSTIC WORKUP AND RELEVANT HISTORY  Sarah Wilcox  [birth year]         [facility]  ECHO 7/17/23 1. Left ventricle: The cavity size is normal. Wall thickness is normal. Systolic function is normal. The ejection fraction was measured by single plane method of disks. Grade I diastolic dysfunction. The ejection fraction is 63%. 2. Right ventricle: The cavity size is severely dilated. Systolic function is severely reduced. Systolic pressure is moderately increased. The estimated peak pressure is 64mm Hg. The  RV pressure during systole is 64mm Hg. 3. Right atrium: The atrium is severely dilated. The estimated central venous pressure is 15mm Hg. 4. Atrial septum: The septum bows from right to left, consistent with increased right atrial pressure. Color doppler shows no obvious shunt. 5. Tricuspid valve: There is moderate regurgitation. 6. Pericardium, extracardiac: There is no pericardial effusion. IMPRESSIONS: Severe pulmonary hypertension with severely dilated and dysfunctional right ventricle. Left ventricular function is normal but appears underfilled. Compared to prior echo 6/1/2023 there is no significant difference.  RHC 10/10/22 RAP 4, PAP62/27/41, PCWP 6, CO 5.4 CI 2.9 by Becca, CO 8.2 CI 4.5 by thermodilution, PVR 6.5 and 4.3, respectively    6/03/21 RAP 24, PAP 77/44/58, PCWP 29, CO 7.0 CI 3.7 by Becca, CO 6.9 CI 3.6 by TD, PA sat 75%, AO sat 100%, systemic /64/80   9/23/20 RA 14 PA 83/37/54 PAWP 13 PA sat 48% Ao 94% Becca CO 3.6/1.9 PVR 11.4wu   9/04/19 Baseline: RA 6, RV 68/-1/7, PA 72/27/43, PCWP 7 /67/81, Becca Co 6.4 / CI 3.21, Thermodilution CO 6.5 / CI 3.4, Pa sat 59%, Ao 100%, PVR 5.6wu  Post 6 Liters O2: PA 70/26/43  Condition 1 post NO 40 ppm x5 min PA 59/23/36, PWCP 12, /75/106, PA sat 64%, Ao sat 99%, Becca CO 5.6 / CI 2.9, Thermodilution CO 6.1 / CI 3.1   [facility] 9/04/19 LVEDP unable to obtain, LAD 10% proximal disease, LCx 20% mid disease, RCA 10% mid disease  V/Q 5/9/19 Ventilation images demonstrate hyperexpanded lungs. No significant delayed washout. Perfusion images demonstrate mildly heterogeneous perfusion. No segmental perfusion mismatch defect is seen. No probability VQ scan for pulmonary embolism.  CTA 6/21/211. No evidence for pulmonary embolism. 2. Persistent main pulmonary artery dilatation. Correlate   for pulmonary arterial hypertension. 3. Moderate cardiomegaly is improved from 05/29/2021. The small pleural effusions seen at that time is almost resolved. 4. Severe  centrilobular emphysema is stable. Findings consistent with resolving Covid pneumonia are markedly improved from 05/29/2021.  6MWT 2/16/23 261m, o2 sats 97%-86% room air, HR , HRR1= 20  8/4/22 315m, os sats 99%-99% on RA, HR 92-93, HRR1= 0. Walk was repeated by two therapists. After 6 mins 02sat 56% HR 73 /71. Probe was change to ear lobe, and 02sat started at 73% and gradually went to 90%. 1 min post walk 02 sat 92% HR 88    5/18/22 351m, O2sats 100%-96% on RA, HR , HRR1= 5  1/20/22 357 meters, o2 sat's 99-93% room air, heart rate , HRR1= 8  10/21/21 312 meters, o2 saturations 100-95 room air, heart rate , HRR1=5   5/13/21 276 meters, o2 sats 100% to 99% room air, heart rate 87 to 105, HRR1=12  PFTS 8/18/20 FVC 2.32 (73%), FEV1 1.79 (70%), FEV1/FVC 77%, TLC 3.95 (74%) DLCO 10.39 (40%)   7/30/19 (So Sub) FVC 2.73 (85%), FEV1 1.98 (76.8%), FEV1/FVC 73%, TLC 78.5%, DLCO 41%  SLEEP  7/31/2020 (home sleep study) Upper airway resistance syndrome, and nocturnal hypoxemia.  CTHR 7/26/19 1. Changes involving bilateral lungs which are consistent with the appearance of moderate, predominantly upper lobe, centrilobular emphysema. 2. Slight interstitial fibrosis involving especially the anterior segments of the bilateral (left side more extensive than right) upper lobes. 3. Slight diffuse enlargement of the thyroid gland. 4. Tiny hiatal hernia. 5. Borderline splenomegaly. 6. Small non-obstructing right upper pole renal calculi, as described. Probable left upper pole renal cyst; if indicated, ultrasound suggested. 7. Other findings, as noted.  CTPE 10/6/22 1. No evidence for PE. 2. Centrilobular emphysema. 3. There is patchy areas of density scattered bilaterally some with nodular configuration. This is more pronounced within the left upper lobe/apex. Findings are suspicious for an inflammatory/infectious process. Subtle infiltrative /neoplastic etiology is not excluded and short-term follow-up is  recommended. 4. Evidence for pulmonary artery hypertension. 5. Cardiomegaly. 6. Mediastinal and bilateral hilar adenopathy mildly increased. Although the findings could be related to a reactive inflammatory/infectious process, neoplastic etiology cannot be excluded.  1/27/21 1. No evidence of pulmonary embolism. 2. Enlargement main pulmonary artery, suggesting   pulmonary arterial hypertension. 3. Moderate centrilobular emphysema. 4. Myocardial prostatomegaly and moderate pericardial effusion. 5. Hepatomegaly.  PULM ANGIO  CMRI 11/12/19 1. The left ventricle is mildly dilated in size with normal systolic function. LVEF = 62.6%. Prominent trabeculations. 2. The right ventricle is dilated in size with moderately reduced systolic function. RVEF = 35.2%. 3. Fibrosis of the inferior and superior RV insertion sites with septal flattening during systolic suggestive of RV pressure overload and a dilated pulmonary artery. This is suggestive of pulmonary hypertension. 4. Moderate-large circumferential pericardial effusion with no evidence of hemodynamic compromise. 5. Tricuspid regurgitation with a dilated right atrium. 6. Mitral regurgitation with the anterior leaflet with a \"hockey stick like\" appearance.  CPX  DRUG/TOXIN USE: Y[_]/N[] ______________  PULMONARY REHAB: Completed Y[_]/N[_] Date: ____________    WHO GROUP 1    PCP & Consulting MDs:       Impression & Plan:    54 y/o F with PMH of idiopathic PAH on dual therapy presents to the hospital for volume overload    Pulmonary Hypertension  - FC IV  - WHO Group 1   - idiopathic PAH  - WHO Group 3   - mild fibrosis and emphysema on CT chest  PH therapies  Nitric oxide: Adempas 2.5mg q8h   ERA: fluid retention with letairis in past   Prostacyclin: Remodulin 150ng/kg/min --> new goal 160ng/kg/min   - echo reviewed, RV remains dilated with reduction in systolic function  Plan  Remodulin 150ng/kg/min - continue, had significant side effects with most recent dose increase      Acute on Chronic Right Sided Heart Failure, Cardiogenic Shock   - requiring inotropic and pressor support --> dobutamine started due to inadequate response initially to diuretics/KUNAL   - NYHA FC IV, Stage D  - appears hypervolemic on exam  - home diuretic torsemide 100mg in AM/80mg in PM + metolazone twice weekly  - diuretic - bumex gtt 2mg/hr, did not have adequate response to lasix gtt + diuril 500mg IVP + diamox 500mg IVP  Plan  Dobutamine  Bumex gtt     Acute on Chronic Hypoxic Respiratory Failure  - on HFNC   - was requiring 2lpm NC earlier in hospitalization   - has required home o2 in the past but unsure of consistent use  - was referred for lung transplant but has not consistently established care  - barriers to moving forward include pt's geographic location, lack of established family/social support  Plan  Continue o2   Wean to keep sats >90%    Hypotension  - asymptomatic  - on midodrine 15mg TID as OP  Plan  Midodrine 20mg TID  Wean pressors     Hyponatremia  - Diuretic induced, hypervolemic hyponatremia  - Requiring high amounts of lasix for days + now bumex + diuril  - s/p Tolvaptan 15mg x 1  Plan  Nephrology managing     S/p VF arrest on 12/23   - required CPR and 1 shock, epi x1 round per documentation    Plan Summary 12/25/23  Had lengthy discussion today regarding her poor prognosis. Informed her that she is showing signs of multi-system organ failure. She is requesting to go home, stating \"I will take my Remodulin, Adempas, use my oxygen and take my blood pressure pills and just let whatever happens, happen.\" Discussed that she is still currently requiring HFNC, inotropes and IV diuretics with minimal urine output and if we stop these medications now that she may die here in the hospital. She verbalized understanding of this. Discussed code status and she states she does not want CPR and does not want intubation. She does want cardioversion/defibrillation and medications at this time. We will  ask palliative/hospice teams to see her to discuss hospice. She is agreeable. Requesting a call to her daughter, Ludy, for update. This NP attempted to call her but no answer or voicemail available. Will continue to attempt to reach her.     Thank you for the opportunity to participate in the care of this patient. Please contact me with any questions.    The Pulmonary Hypertension Service can be reached 24/7 via [employer]. Please page the on-call physician or nurse practitioner by searching 'pulmonary hypertension'.    Attending Attestation     I, Lopez Lopez MD, independently interviewed and examined the patient myself.  I have reviewed the plan with the APN and I agree with the documentation above.      Lopez Lopez MD      Advanced Heart Failure and Transplant Cardiology Fellowship   [facility]   Assistant Clinical Professor of Medicine [facility]   [place] Pager: [phone]  Office:       also CBD/THC gummies and cream for lumbar radiculopathy/neuropathy

## 2024-03-28 NOTE — H&P PST ADULT - NSICDXPASTSURGICALHX_GEN_ALL_CORE_FT
PAST SURGICAL HISTORY:  H/O dilation and curettage     History of lumbar laminectomy     History of lumbar spinal fusion

## 2024-03-28 NOTE — H&P PST ADULT - NSICDXFAMILYHX_GEN_ALL_CORE_FT
FAMILY HISTORY:  Mother  Still living? Yes, Estimated age: 71-80  FH: type 2 diabetes, Age at diagnosis: Age Unknown

## 2024-03-28 NOTE — H&P PST ADULT - CARDIOVASCULAR SYMPTOMS
chest pressure, sharp pain in the heart intermittently since November 2023, in process of evaluation with PCP/palpitations/dyspnea on exertion
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
## 2024-03-28 NOTE — H&P PST ADULT - MUSCULOSKELETAL
decreased strength right lower extremity/normal gait/strength 5/5 bilateral upper extremities details…
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
## 2024-03-28 NOTE — H&P PST ADULT - NSICDXPASTMEDICALHX_GEN_ALL_CORE_FT
PAST MEDICAL HISTORY:  Anxiety     Asthma     Borderline personality disorder     Cyclic vomiting syndrome     Foot drop, right     Herniated nucleus pulposus, L4-5     Herpes     History of endometriosis     Lumbar radiculopathy     Lumbar stenosis with neurogenic claudication     Marijuana smoker     Migraines     Nicotine vapor product user     Post traumatic stress disorder (PTSD)     Roundworm infection

## 2024-03-28 NOTE — H&P PST ADULT - GASTROINTESTINAL COMMENTS
Patient with roundworm infection while in [place], prior to returning home November 17, 2023.  Has had continued Gi symptoms.  S/P ebdoscopy and colonoscopy 03/26/2024

## 2024-03-29 DIAGNOSIS — M48.062 SPINAL STENOSIS, LUMBAR REGION WITH NEUROGENIC CLAUDICATION: ICD-10-CM

## 2024-03-29 DIAGNOSIS — S31.000A UNSPECIFIED OPEN WOUND OF LOWER BACK AND PELVIS WITHOUT PENETRATION INTO RETROPERITONEUM, INITIAL ENCOUNTER: ICD-10-CM

## 2024-03-29 DIAGNOSIS — Z01.818 ENCOUNTER FOR OTHER PREPROCEDURAL EXAMINATION: ICD-10-CM

## 2024-03-29 LAB
A1C WITH ESTIMATED AVERAGE GLUCOSE RESULT: 4.8 % — SIGNIFICANT CHANGE UP (ref 4–5.6)
ESTIMATED AVERAGE GLUCOSE: 91 MG/DL — SIGNIFICANT CHANGE UP (ref 68–114)
MRSA PCR RESULT.: SIGNIFICANT CHANGE UP
S AUREUS DNA NOSE QL NAA+PROBE: SIGNIFICANT CHANGE UP

## 2024-04-01 LAB
-  AMOXICILLIN/CLAVULANIC ACID: SIGNIFICANT CHANGE UP
-  AMPICILLIN/SULBACTAM: SIGNIFICANT CHANGE UP
-  AMPICILLIN: SIGNIFICANT CHANGE UP
-  AZTREONAM: SIGNIFICANT CHANGE UP
-  CEFAZOLIN: SIGNIFICANT CHANGE UP
-  CEFEPIME: SIGNIFICANT CHANGE UP
-  CEFTRIAXONE: SIGNIFICANT CHANGE UP
-  CEFUROXIME: SIGNIFICANT CHANGE UP
-  CIPROFLOXACIN: SIGNIFICANT CHANGE UP
-  ERTAPENEM: SIGNIFICANT CHANGE UP
-  GENTAMICIN: SIGNIFICANT CHANGE UP
-  IMIPENEM: SIGNIFICANT CHANGE UP
-  LEVOFLOXACIN: SIGNIFICANT CHANGE UP
-  MEROPENEM: SIGNIFICANT CHANGE UP
-  NITROFURANTOIN: SIGNIFICANT CHANGE UP
-  PIPERACILLIN/TAZOBACTAM: SIGNIFICANT CHANGE UP
-  TOBRAMYCIN: SIGNIFICANT CHANGE UP
-  TRIMETHOPRIM/SULFAMETHOXAZOLE: SIGNIFICANT CHANGE UP
CULTURE RESULTS: ABNORMAL
METHOD TYPE: SIGNIFICANT CHANGE UP
ORGANISM # SPEC MICROSCOPIC CNT: ABNORMAL
ORGANISM # SPEC MICROSCOPIC CNT: SIGNIFICANT CHANGE UP
SPECIMEN SOURCE: SIGNIFICANT CHANGE UP

## 2024-04-02 RX ORDER — TRANEXAMIC ACID 100 MG/ML
1000 INJECTION, SOLUTION INTRAVENOUS ONCE
Refills: 0 | Status: DISCONTINUED | OUTPATIENT
Start: 2024-04-03 | End: 2024-04-10

## 2024-04-02 RX ORDER — TRANEXAMIC ACID 100 MG/ML
1 INJECTION, SOLUTION INTRAVENOUS
Qty: 1000 | Refills: 0 | Status: DISCONTINUED | OUTPATIENT
Start: 2024-04-03 | End: 2024-04-10

## 2024-04-03 ENCOUNTER — INPATIENT (INPATIENT)
Facility: HOSPITAL | Age: 39
LOS: 6 days | Discharge: HOME HEALTH | DRG: 951 | End: 2024-04-10
Attending: ORTHOPAEDIC SURGERY | Admitting: ORTHOPAEDIC SURGERY
Payer: MEDICARE

## 2024-04-03 VITALS
WEIGHT: 126.1 LBS | SYSTOLIC BLOOD PRESSURE: 92 MMHG | RESPIRATION RATE: 16 BRPM | HEIGHT: 66 IN | HEART RATE: 87 BPM | OXYGEN SATURATION: 98 % | TEMPERATURE: 98 F | DIASTOLIC BLOOD PRESSURE: 48 MMHG

## 2024-04-03 DIAGNOSIS — Z98.1 ARTHRODESIS STATUS: ICD-10-CM

## 2024-04-03 DIAGNOSIS — Z98.1 ARTHRODESIS STATUS: Chronic | ICD-10-CM

## 2024-04-03 DIAGNOSIS — M48.062 SPINAL STENOSIS, LUMBAR REGION WITH NEUROGENIC CLAUDICATION: ICD-10-CM

## 2024-04-03 DIAGNOSIS — Z98.890 OTHER SPECIFIED POSTPROCEDURAL STATES: Chronic | ICD-10-CM

## 2024-04-03 DIAGNOSIS — S31.000A UNSPECIFIED OPEN WOUND OF LOWER BACK AND PELVIS WITHOUT PENETRATION INTO RETROPERITONEUM, INITIAL ENCOUNTER: ICD-10-CM

## 2024-04-03 LAB
ANION GAP SERPL CALC-SCNC: 3 MMOL/L — LOW (ref 5–17)
BUN SERPL-MCNC: 7 MG/DL — SIGNIFICANT CHANGE UP (ref 7–23)
CALCIUM SERPL-MCNC: 8.2 MG/DL — LOW (ref 8.5–10.1)
CHLORIDE SERPL-SCNC: 111 MMOL/L — HIGH (ref 96–108)
CO2 SERPL-SCNC: 27 MMOL/L — SIGNIFICANT CHANGE UP (ref 22–31)
CREAT SERPL-MCNC: 0.73 MG/DL — SIGNIFICANT CHANGE UP (ref 0.5–1.3)
EGFR: 109 ML/MIN/1.73M2 — SIGNIFICANT CHANGE UP
GLUCOSE BLDC GLUCOMTR-MCNC: 112 MG/DL — HIGH (ref 70–99)
GLUCOSE BLDC GLUCOMTR-MCNC: 113 MG/DL — HIGH (ref 70–99)
GLUCOSE BLDC GLUCOMTR-MCNC: 94 MG/DL — SIGNIFICANT CHANGE UP (ref 70–99)
GLUCOSE SERPL-MCNC: 120 MG/DL — HIGH (ref 70–99)
HCG UR QL: NEGATIVE — SIGNIFICANT CHANGE UP
HCT VFR BLD CALC: 29.5 % — LOW (ref 34.5–45)
HGB BLD-MCNC: 9.7 G/DL — LOW (ref 11.5–15.5)
MCHC RBC-ENTMCNC: 30.6 PG — SIGNIFICANT CHANGE UP (ref 27–34)
MCHC RBC-ENTMCNC: 32.9 GM/DL — SIGNIFICANT CHANGE UP (ref 32–36)
MCV RBC AUTO: 93.1 FL — SIGNIFICANT CHANGE UP (ref 80–100)
PLATELET # BLD AUTO: 185 K/UL — SIGNIFICANT CHANGE UP (ref 150–400)
POTASSIUM SERPL-MCNC: 4 MMOL/L — SIGNIFICANT CHANGE UP (ref 3.5–5.3)
POTASSIUM SERPL-SCNC: 4 MMOL/L — SIGNIFICANT CHANGE UP (ref 3.5–5.3)
RBC # BLD: 3.17 M/UL — LOW (ref 3.8–5.2)
RBC # FLD: 12.1 % — SIGNIFICANT CHANGE UP (ref 10.3–14.5)
SODIUM SERPL-SCNC: 141 MMOL/L — SIGNIFICANT CHANGE UP (ref 135–145)
WBC # BLD: 5.09 K/UL — SIGNIFICANT CHANGE UP (ref 3.8–10.5)
WBC # FLD AUTO: 5.09 K/UL — SIGNIFICANT CHANGE UP (ref 3.8–10.5)

## 2024-04-03 PROCEDURE — 36415 COLL VENOUS BLD VENIPUNCTURE: CPT

## 2024-04-03 PROCEDURE — 97116 GAIT TRAINING THERAPY: CPT | Mod: GP

## 2024-04-03 PROCEDURE — C1889: CPT

## 2024-04-03 PROCEDURE — 97163 PT EVAL HIGH COMPLEX 45 MIN: CPT | Mod: GP

## 2024-04-03 PROCEDURE — 88304 TISSUE EXAM BY PATHOLOGIST: CPT

## 2024-04-03 PROCEDURE — 76000 FLUOROSCOPY <1 HR PHYS/QHP: CPT

## 2024-04-03 PROCEDURE — 97530 THERAPEUTIC ACTIVITIES: CPT | Mod: GP

## 2024-04-03 PROCEDURE — 97535 SELF CARE MNGMENT TRAINING: CPT | Mod: GO

## 2024-04-03 PROCEDURE — 80048 BASIC METABOLIC PNL TOTAL CA: CPT

## 2024-04-03 PROCEDURE — 88304 TISSUE EXAM BY PATHOLOGIST: CPT | Mod: 26

## 2024-04-03 PROCEDURE — C1713: CPT

## 2024-04-03 PROCEDURE — 82962 GLUCOSE BLOOD TEST: CPT

## 2024-04-03 PROCEDURE — 81001 URINALYSIS AUTO W/SCOPE: CPT

## 2024-04-03 PROCEDURE — 97166 OT EVAL MOD COMPLEX 45 MIN: CPT | Mod: GO

## 2024-04-03 PROCEDURE — 81025 URINE PREGNANCY TEST: CPT

## 2024-04-03 PROCEDURE — 86891 AUTOLOGOUS BLOOD OP SALVAGE: CPT

## 2024-04-03 PROCEDURE — 85027 COMPLETE CBC AUTOMATED: CPT

## 2024-04-03 RX ORDER — PANTOPRAZOLE SODIUM 20 MG/1
40 TABLET, DELAYED RELEASE ORAL
Refills: 0 | Status: DISCONTINUED | OUTPATIENT
Start: 2024-04-03 | End: 2024-04-10

## 2024-04-03 RX ORDER — NALOXONE HYDROCHLORIDE 4 MG/.1ML
0.1 SPRAY NASAL
Refills: 0 | Status: DISCONTINUED | OUTPATIENT
Start: 2024-04-03 | End: 2024-04-10

## 2024-04-03 RX ORDER — ACETAMINOPHEN 500 MG
1000 TABLET ORAL ONCE
Refills: 0 | Status: COMPLETED | OUTPATIENT
Start: 2024-04-05 | End: 2024-04-05

## 2024-04-03 RX ORDER — HYDROMORPHONE HYDROCHLORIDE 2 MG/ML
0.5 INJECTION INTRAMUSCULAR; INTRAVENOUS; SUBCUTANEOUS ONCE
Refills: 0 | Status: DISCONTINUED | OUTPATIENT
Start: 2024-04-03 | End: 2024-04-03

## 2024-04-03 RX ORDER — ONDANSETRON 8 MG/1
4 TABLET, FILM COATED ORAL ONCE
Refills: 0 | Status: DISCONTINUED | OUTPATIENT
Start: 2024-04-03 | End: 2024-04-03

## 2024-04-03 RX ORDER — VALACYCLOVIR 500 MG/1
500 TABLET, FILM COATED ORAL
Refills: 0 | Status: DISCONTINUED | OUTPATIENT
Start: 2024-04-03 | End: 2024-04-10

## 2024-04-03 RX ORDER — ACETAMINOPHEN 500 MG
1000 TABLET ORAL ONCE
Refills: 0 | Status: COMPLETED | OUTPATIENT
Start: 2024-04-04 | End: 2024-04-04

## 2024-04-03 RX ORDER — TIZANIDINE 4 MG/1
1 TABLET ORAL
Refills: 0 | DISCHARGE

## 2024-04-03 RX ORDER — LANOLIN ALCOHOL/MO/W.PET/CERES
5 CREAM (GRAM) TOPICAL AT BEDTIME
Refills: 0 | Status: DISCONTINUED | OUTPATIENT
Start: 2024-04-03 | End: 2024-04-10

## 2024-04-03 RX ORDER — HYDROMORPHONE HYDROCHLORIDE 2 MG/ML
30 INJECTION INTRAMUSCULAR; INTRAVENOUS; SUBCUTANEOUS
Refills: 0 | Status: DISCONTINUED | OUTPATIENT
Start: 2024-04-03 | End: 2024-04-04

## 2024-04-03 RX ORDER — SENNA PLUS 8.6 MG/1
2 TABLET ORAL AT BEDTIME
Refills: 0 | Status: DISCONTINUED | OUTPATIENT
Start: 2024-04-03 | End: 2024-04-10

## 2024-04-03 RX ORDER — MAGNESIUM HYDROXIDE 400 MG/1
30 TABLET, CHEWABLE ORAL DAILY
Refills: 0 | Status: DISCONTINUED | OUTPATIENT
Start: 2024-04-03 | End: 2024-04-10

## 2024-04-03 RX ORDER — CEFAZOLIN SODIUM 1 G
2000 VIAL (EA) INJECTION EVERY 8 HOURS
Refills: 0 | Status: COMPLETED | OUTPATIENT
Start: 2024-04-03 | End: 2024-04-04

## 2024-04-03 RX ORDER — POLYETHYLENE GLYCOL 3350 17 G/17G
17 POWDER, FOR SOLUTION ORAL AT BEDTIME
Refills: 0 | Status: DISCONTINUED | OUTPATIENT
Start: 2024-04-03 | End: 2024-04-08

## 2024-04-03 RX ORDER — ONDANSETRON 8 MG/1
4 TABLET, FILM COATED ORAL EVERY 6 HOURS
Refills: 0 | Status: DISCONTINUED | OUTPATIENT
Start: 2024-04-03 | End: 2024-04-10

## 2024-04-03 RX ORDER — ONDANSETRON 8 MG/1
4 TABLET, FILM COATED ORAL EVERY 6 HOURS
Refills: 0 | Status: DISCONTINUED | OUTPATIENT
Start: 2024-04-03 | End: 2024-04-03

## 2024-04-03 RX ORDER — ACETAMINOPHEN 500 MG
1000 TABLET ORAL ONCE
Refills: 0 | Status: COMPLETED | OUTPATIENT
Start: 2024-04-03 | End: 2024-04-03

## 2024-04-03 RX ORDER — DEXAMETHASONE 0.5 MG/5ML
4 ELIXIR ORAL EVERY 8 HOURS
Refills: 0 | Status: DISCONTINUED | OUTPATIENT
Start: 2024-04-03 | End: 2024-04-04

## 2024-04-03 RX ORDER — HYDROMORPHONE HYDROCHLORIDE 2 MG/ML
0.5 INJECTION INTRAMUSCULAR; INTRAVENOUS; SUBCUTANEOUS
Refills: 0 | Status: DISCONTINUED | OUTPATIENT
Start: 2024-04-03 | End: 2024-04-04

## 2024-04-03 RX ORDER — CEFAZOLIN SODIUM 1 G
2000 VIAL (EA) INJECTION EVERY 8 HOURS
Refills: 0 | Status: DISCONTINUED | OUTPATIENT
Start: 2024-04-03 | End: 2024-04-03

## 2024-04-03 RX ORDER — CELECOXIB 200 MG/1
200 CAPSULE ORAL ONCE
Refills: 0 | Status: COMPLETED | OUTPATIENT
Start: 2024-04-03 | End: 2024-04-03

## 2024-04-03 RX ORDER — GABAPENTIN 400 MG/1
300 CAPSULE ORAL ONCE
Refills: 0 | Status: COMPLETED | OUTPATIENT
Start: 2024-04-03 | End: 2024-04-03

## 2024-04-03 RX ORDER — HYDROMORPHONE HYDROCHLORIDE 2 MG/ML
1 INJECTION INTRAMUSCULAR; INTRAVENOUS; SUBCUTANEOUS
Refills: 0 | Status: DISCONTINUED | OUTPATIENT
Start: 2024-04-03 | End: 2024-04-03

## 2024-04-03 RX ORDER — VALACYCLOVIR 500 MG/1
1 TABLET, FILM COATED ORAL
Refills: 0 | DISCHARGE

## 2024-04-03 RX ORDER — OXYCODONE HYDROCHLORIDE 5 MG/1
10 TABLET ORAL ONCE
Refills: 0 | Status: DISCONTINUED | OUTPATIENT
Start: 2024-04-03 | End: 2024-04-03

## 2024-04-03 RX ORDER — SODIUM CHLORIDE 9 MG/ML
1000 INJECTION, SOLUTION INTRAVENOUS ONCE
Refills: 0 | Status: COMPLETED | OUTPATIENT
Start: 2024-04-03 | End: 2024-04-03

## 2024-04-03 RX ORDER — SPIRONOLACTONE 25 MG/1
1 TABLET, FILM COATED ORAL
Refills: 0 | DISCHARGE

## 2024-04-03 RX ORDER — SPIRONOLACTONE 25 MG/1
50 TABLET, FILM COATED ORAL DAILY
Refills: 0 | Status: DISCONTINUED | OUTPATIENT
Start: 2024-04-03 | End: 2024-04-10

## 2024-04-03 RX ORDER — SODIUM CHLORIDE 9 MG/ML
1000 INJECTION, SOLUTION INTRAVENOUS
Refills: 0 | Status: DISCONTINUED | OUTPATIENT
Start: 2024-04-03 | End: 2024-04-03

## 2024-04-03 RX ORDER — DEXAMETHASONE 0.5 MG/5ML
4 ELIXIR ORAL EVERY 8 HOURS
Refills: 0 | Status: COMPLETED | OUTPATIENT
Start: 2024-04-03 | End: 2024-04-03

## 2024-04-03 RX ADMIN — SODIUM CHLORIDE 1000 MILLILITER(S): 9 INJECTION, SOLUTION INTRAVENOUS at 14:34

## 2024-04-03 RX ADMIN — ONDANSETRON 4 MILLIGRAM(S): 8 TABLET, FILM COATED ORAL at 18:01

## 2024-04-03 RX ADMIN — Medication 4 MILLIGRAM(S): at 22:18

## 2024-04-03 RX ADMIN — HYDROMORPHONE HYDROCHLORIDE 0.5 MILLIGRAM(S): 2 INJECTION INTRAMUSCULAR; INTRAVENOUS; SUBCUTANEOUS at 12:36

## 2024-04-03 RX ADMIN — Medication 200 MILLIGRAM(S): at 21:21

## 2024-04-03 RX ADMIN — SODIUM CHLORIDE 75 MILLILITER(S): 9 INJECTION, SOLUTION INTRAVENOUS at 12:37

## 2024-04-03 RX ADMIN — HYDROMORPHONE HYDROCHLORIDE 0.5 MILLIGRAM(S): 2 INJECTION INTRAMUSCULAR; INTRAVENOUS; SUBCUTANEOUS at 12:51

## 2024-04-03 RX ADMIN — HYDROMORPHONE HYDROCHLORIDE 0.5 MILLIGRAM(S): 2 INJECTION INTRAMUSCULAR; INTRAVENOUS; SUBCUTANEOUS at 21:03

## 2024-04-03 RX ADMIN — Medication 5 MILLIGRAM(S): at 22:15

## 2024-04-03 RX ADMIN — Medication 2000 MILLIGRAM(S): at 21:02

## 2024-04-03 RX ADMIN — OXYCODONE HYDROCHLORIDE 10 MILLIGRAM(S): 5 TABLET ORAL at 07:21

## 2024-04-03 RX ADMIN — Medication 4 MILLIGRAM(S): at 14:35

## 2024-04-03 RX ADMIN — HYDROMORPHONE HYDROCHLORIDE 0.5 MILLIGRAM(S): 2 INJECTION INTRAMUSCULAR; INTRAVENOUS; SUBCUTANEOUS at 15:03

## 2024-04-03 RX ADMIN — HYDROMORPHONE HYDROCHLORIDE 0.5 MILLIGRAM(S): 2 INJECTION INTRAMUSCULAR; INTRAVENOUS; SUBCUTANEOUS at 16:56

## 2024-04-03 RX ADMIN — CELECOXIB 200 MILLIGRAM(S): 200 CAPSULE ORAL at 07:24

## 2024-04-03 RX ADMIN — CELECOXIB 200 MILLIGRAM(S): 200 CAPSULE ORAL at 07:21

## 2024-04-03 RX ADMIN — HYDROMORPHONE HYDROCHLORIDE 30 MILLILITER(S): 2 INJECTION INTRAMUSCULAR; INTRAVENOUS; SUBCUTANEOUS at 12:15

## 2024-04-03 RX ADMIN — Medication 1000 MILLIGRAM(S): at 17:30

## 2024-04-03 RX ADMIN — OXYCODONE HYDROCHLORIDE 10 MILLIGRAM(S): 5 TABLET ORAL at 07:24

## 2024-04-03 RX ADMIN — Medication 400 MILLIGRAM(S): at 17:13

## 2024-04-03 NOTE — PROGRESS NOTE ADULT - SUBJECTIVE AND OBJECTIVE BOX
Pt is a 38 YOF who had a revision lumbar decompression and fusion L4-L5. Pt has a drop foot condition and was evaluated for an EZ Stride AFO. Pt was sleeping s/p surgery. Pt was sized for the AFO and her nurse signed for the brace.  Socks were dispensed to prevent skin abrasion and for hygiene purposes. Any questions, please call [facility] at [phone]

## 2024-04-03 NOTE — BRIEF OPERATIVE NOTE - NSICDXBRIEFPROCEDURE_GEN_ALL_CORE_FT
PROCEDURES:  Revision, fusion, spine, lumbar, PLIF 03-Apr-2024 12:18:09 L4-5 revision PLIF and decompression Fadi Matos

## 2024-04-03 NOTE — ASU PREOP CHECKLIST - DENTURES
Patient has a lab only appointment and there are no lab orders in his chart. It looks like you wanted him to come back for a repeat CBC (see lab note from 11/7/2017). Please advise.    Thanks!  Pat Locke  
no

## 2024-04-04 LAB
ANION GAP SERPL CALC-SCNC: 7 MMOL/L — SIGNIFICANT CHANGE UP (ref 5–17)
BUN SERPL-MCNC: 8 MG/DL — SIGNIFICANT CHANGE UP (ref 7–23)
CALCIUM SERPL-MCNC: 8.2 MG/DL — LOW (ref 8.5–10.1)
CHLORIDE SERPL-SCNC: 108 MMOL/L — SIGNIFICANT CHANGE UP (ref 96–108)
CO2 SERPL-SCNC: 25 MMOL/L — SIGNIFICANT CHANGE UP (ref 22–31)
CREAT SERPL-MCNC: 0.49 MG/DL — LOW (ref 0.5–1.3)
EGFR: 125 ML/MIN/1.73M2 — SIGNIFICANT CHANGE UP
GLUCOSE SERPL-MCNC: 96 MG/DL — SIGNIFICANT CHANGE UP (ref 70–99)
HCT VFR BLD CALC: 28.4 % — LOW (ref 34.5–45)
HGB BLD-MCNC: 9.3 G/DL — LOW (ref 11.5–15.5)
MCHC RBC-ENTMCNC: 30.6 PG — SIGNIFICANT CHANGE UP (ref 27–34)
MCHC RBC-ENTMCNC: 32.7 GM/DL — SIGNIFICANT CHANGE UP (ref 32–36)
MCV RBC AUTO: 93.4 FL — SIGNIFICANT CHANGE UP (ref 80–100)
PLATELET # BLD AUTO: 188 K/UL — SIGNIFICANT CHANGE UP (ref 150–400)
POTASSIUM SERPL-MCNC: 3.8 MMOL/L — SIGNIFICANT CHANGE UP (ref 3.5–5.3)
POTASSIUM SERPL-SCNC: 3.8 MMOL/L — SIGNIFICANT CHANGE UP (ref 3.5–5.3)
RBC # BLD: 3.04 M/UL — LOW (ref 3.8–5.2)
RBC # FLD: 12.1 % — SIGNIFICANT CHANGE UP (ref 10.3–14.5)
SODIUM SERPL-SCNC: 140 MMOL/L — SIGNIFICANT CHANGE UP (ref 135–145)
WBC # BLD: 9.75 K/UL — SIGNIFICANT CHANGE UP (ref 3.8–10.5)
WBC # FLD AUTO: 9.75 K/UL — SIGNIFICANT CHANGE UP (ref 3.8–10.5)

## 2024-04-04 PROCEDURE — 90792 PSYCH DIAG EVAL W/MED SRVCS: CPT

## 2024-04-04 PROCEDURE — 99232 SBSQ HOSP IP/OBS MODERATE 35: CPT

## 2024-04-04 RX ORDER — HYDROMORPHONE HYDROCHLORIDE 2 MG/ML
0.5 INJECTION INTRAMUSCULAR; INTRAVENOUS; SUBCUTANEOUS ONCE
Refills: 0 | Status: DISCONTINUED | OUTPATIENT
Start: 2024-04-04 | End: 2024-04-04

## 2024-04-04 RX ORDER — CYCLOBENZAPRINE HYDROCHLORIDE 10 MG/1
10 TABLET, FILM COATED ORAL THREE TIMES A DAY
Refills: 0 | Status: DISCONTINUED | OUTPATIENT
Start: 2024-04-04 | End: 2024-04-10

## 2024-04-04 RX ORDER — ALPRAZOLAM 0.25 MG
0.5 TABLET ORAL ONCE
Refills: 0 | Status: DISCONTINUED | OUTPATIENT
Start: 2024-04-04 | End: 2024-04-04

## 2024-04-04 RX ORDER — CYCLOBENZAPRINE HYDROCHLORIDE 10 MG/1
10 TABLET, FILM COATED ORAL THREE TIMES A DAY
Refills: 0 | Status: DISCONTINUED | OUTPATIENT
Start: 2024-04-04 | End: 2024-04-04

## 2024-04-04 RX ORDER — OXYCODONE HYDROCHLORIDE 5 MG/1
5 TABLET ORAL EVERY 4 HOURS
Refills: 0 | Status: DISCONTINUED | OUTPATIENT
Start: 2024-04-04 | End: 2024-04-10

## 2024-04-04 RX ORDER — OXYCODONE HYDROCHLORIDE 5 MG/1
10 TABLET ORAL EVERY 4 HOURS
Refills: 0 | Status: DISCONTINUED | OUTPATIENT
Start: 2024-04-04 | End: 2024-04-10

## 2024-04-04 RX ORDER — ACETAMINOPHEN 500 MG
1000 TABLET ORAL ONCE
Refills: 0 | Status: DISCONTINUED | OUTPATIENT
Start: 2024-04-04 | End: 2024-04-04

## 2024-04-04 RX ORDER — SODIUM CHLORIDE 9 MG/ML
500 INJECTION INTRAMUSCULAR; INTRAVENOUS; SUBCUTANEOUS ONCE
Refills: 0 | Status: COMPLETED | OUTPATIENT
Start: 2024-04-04 | End: 2024-04-04

## 2024-04-04 RX ORDER — OXYCODONE HYDROCHLORIDE 5 MG/1
10 TABLET ORAL EVERY 12 HOURS
Refills: 0 | Status: DISCONTINUED | OUTPATIENT
Start: 2024-04-04 | End: 2024-04-10

## 2024-04-04 RX ORDER — TRAZODONE HCL 50 MG
50 TABLET ORAL AT BEDTIME
Refills: 0 | Status: DISCONTINUED | OUTPATIENT
Start: 2024-04-04 | End: 2024-04-08

## 2024-04-04 RX ORDER — SODIUM CHLORIDE 9 MG/ML
1000 INJECTION INTRAMUSCULAR; INTRAVENOUS; SUBCUTANEOUS
Refills: 0 | Status: DISCONTINUED | OUTPATIENT
Start: 2024-04-04 | End: 2024-04-10

## 2024-04-04 RX ORDER — SODIUM CHLORIDE 9 MG/ML
1000 INJECTION INTRAMUSCULAR; INTRAVENOUS; SUBCUTANEOUS ONCE
Refills: 0 | Status: COMPLETED | OUTPATIENT
Start: 2024-04-04 | End: 2024-04-04

## 2024-04-04 RX ORDER — ALPRAZOLAM 0.25 MG
0.5 TABLET ORAL AT BEDTIME
Refills: 0 | Status: DISCONTINUED | OUTPATIENT
Start: 2024-04-04 | End: 2024-04-04

## 2024-04-04 RX ORDER — HYDROMORPHONE HYDROCHLORIDE 2 MG/ML
0.5 INJECTION INTRAMUSCULAR; INTRAVENOUS; SUBCUTANEOUS EVERY 4 HOURS
Refills: 0 | Status: DISCONTINUED | OUTPATIENT
Start: 2024-04-04 | End: 2024-04-08

## 2024-04-04 RX ADMIN — Medication 400 MILLIGRAM(S): at 12:32

## 2024-04-04 RX ADMIN — HYDROMORPHONE HYDROCHLORIDE 0.5 MILLIGRAM(S): 2 INJECTION INTRAMUSCULAR; INTRAVENOUS; SUBCUTANEOUS at 09:41

## 2024-04-04 RX ADMIN — VALACYCLOVIR 500 MILLIGRAM(S): 500 TABLET, FILM COATED ORAL at 20:40

## 2024-04-04 RX ADMIN — HYDROMORPHONE HYDROCHLORIDE 0.5 MILLIGRAM(S): 2 INJECTION INTRAMUSCULAR; INTRAVENOUS; SUBCUTANEOUS at 21:02

## 2024-04-04 RX ADMIN — SODIUM CHLORIDE 100 MILLILITER(S): 9 INJECTION INTRAMUSCULAR; INTRAVENOUS; SUBCUTANEOUS at 18:58

## 2024-04-04 RX ADMIN — CYCLOBENZAPRINE HYDROCHLORIDE 10 MILLIGRAM(S): 10 TABLET, FILM COATED ORAL at 20:40

## 2024-04-04 RX ADMIN — Medication 400 MILLIGRAM(S): at 06:37

## 2024-04-04 RX ADMIN — Medication 0.5 MILLIGRAM(S): at 10:05

## 2024-04-04 RX ADMIN — SENNA PLUS 2 TABLET(S): 8.6 TABLET ORAL at 20:39

## 2024-04-04 RX ADMIN — ONDANSETRON 4 MILLIGRAM(S): 8 TABLET, FILM COATED ORAL at 02:25

## 2024-04-04 RX ADMIN — Medication 5 MILLIGRAM(S): at 20:39

## 2024-04-04 RX ADMIN — SODIUM CHLORIDE 1000 MILLILITER(S): 9 INJECTION INTRAMUSCULAR; INTRAVENOUS; SUBCUTANEOUS at 17:55

## 2024-04-04 RX ADMIN — Medication 400 MILLIGRAM(S): at 17:45

## 2024-04-04 RX ADMIN — POLYETHYLENE GLYCOL 3350 17 GRAM(S): 17 POWDER, FOR SOLUTION ORAL at 20:40

## 2024-04-04 RX ADMIN — HYDROMORPHONE HYDROCHLORIDE 0.5 MILLIGRAM(S): 2 INJECTION INTRAMUSCULAR; INTRAVENOUS; SUBCUTANEOUS at 14:48

## 2024-04-04 RX ADMIN — CYCLOBENZAPRINE HYDROCHLORIDE 10 MILLIGRAM(S): 10 TABLET, FILM COATED ORAL at 03:56

## 2024-04-04 RX ADMIN — Medication 2000 MILLIGRAM(S): at 06:28

## 2024-04-04 RX ADMIN — Medication 4 MILLIGRAM(S): at 06:29

## 2024-04-04 RX ADMIN — HYDROMORPHONE HYDROCHLORIDE 0.5 MILLIGRAM(S): 2 INJECTION INTRAMUSCULAR; INTRAVENOUS; SUBCUTANEOUS at 15:45

## 2024-04-04 RX ADMIN — CYCLOBENZAPRINE HYDROCHLORIDE 10 MILLIGRAM(S): 10 TABLET, FILM COATED ORAL at 14:48

## 2024-04-04 RX ADMIN — SODIUM CHLORIDE 1000 MILLILITER(S): 9 INJECTION INTRAMUSCULAR; INTRAVENOUS; SUBCUTANEOUS at 21:03

## 2024-04-04 RX ADMIN — Medication 50 MILLIGRAM(S): at 20:39

## 2024-04-04 RX ADMIN — HYDROMORPHONE HYDROCHLORIDE 0.5 MILLIGRAM(S): 2 INJECTION INTRAMUSCULAR; INTRAVENOUS; SUBCUTANEOUS at 21:17

## 2024-04-04 NOTE — PROGRESS NOTE ADULT - ASSESSMENT
38 year old Female POD#1 L3-4 fusion, L4-5 laminectomy, discectomy, posterior fusion with instrumentation. Patient with noted increased bouts of anxiety and expected post operative pain     Recommendation:   Continue to trend CT as per plastics  D/C Simon  D/C PCA  Ofirmev ATC for today and tomorrow  Oxycontin 10 mg BID for sustained pain control  Flexeril for muscle relaxant  Oxycodone 5 IR q 4 for VAS 1-3, Oxycodone 10 IR q 4 for VAS 4-6, Dilaudid 0.5 mg IVP q 4 for VAS 7-10  Xanax for Anxiety  OK for PT OOB with Brace  SCD for DVT ppx  Case was discussed with Dr. Yanes

## 2024-04-04 NOTE — BH CONSULTATION LIAISON ASSESSMENT NOTE - NSBHCHARTREVIEWLAB_PSY_A_CORE FT
04-04    140  |  108  |  8   ----------------------------<  96  3.8   |  25  |  0.49<L>    Ca    8.2<L>      04 Apr 2024 06:29                          9.3    9.75  )-----------( 188      ( 04 Apr 2024 06:29 )             28.4

## 2024-04-04 NOTE — OCCUPATIONAL THERAPY INITIAL EVALUATION ADULT - ADL RETRAINING, OT EVAL
Patient will participate in lower body dressing with  MOD I  in 2 weeks  Patient will participate in toilet transfer with  MOD I  in 2 weeks  Patient will participate in toileting with  MOD I    in 2 weeks  Patient will participate in grooming standing at the sink with  MOD I  in 2 weeks

## 2024-04-04 NOTE — BH CONSULTATION LIAISON ASSESSMENT NOTE - NSBHCHARTREVIEWVS_PSY_A_CORE FT
Vital Signs Last 24 Hrs  T(C): 36.4 (04 Apr 2024 08:10), Max: 36.4 (03 Apr 2024 16:30)  T(F): 97.5 (04 Apr 2024 08:10), Max: 97.6 (03 Apr 2024 16:30)  HR: 64 (04 Apr 2024 08:10) (49 - 78)  BP: 91/51 (04 Apr 2024 08:10) (85/54 - 112/77)  BP(mean): 54 (03 Apr 2024 17:17) (54 - 54)  RR: 18 (04 Apr 2024 08:10) (11 - 18)  SpO2: 100% (04 Apr 2024 08:10) (94% - 100%)    Parameters below as of 04 Apr 2024 08:10  Patient On (Oxygen Delivery Method): room air

## 2024-04-04 NOTE — BH CONSULTATION LIAISON ASSESSMENT NOTE - RISK ASSESSMENT
Acute Suicide Risk  (  ) High   ( X ) Moderate    Rationale _____pt denies but Pt impulsive_  Violence Risk:  ( X ) High   Rationale ___pt agitated, violent, behavioral issues.   Elevated Chronic Risk   (  X) Yes ___________  Details ___borderline personality d/o with chronic behavioral issues__  C-SSRS Screener  1. Have you ever wished to be dead or wished you could go to sleep and not wake up?  [  ]Yes, [ X ]No, [  ]Unable to Assess  2. Have you actually had any thoughts of killing yourself?   [  ]Yes, [X  ]No, [  ]Unable to Assess  Additional Suicide Risk Factors (select all that apply)  [  ]Access to lethal means including firearms  [  ]Family history of suicide  [ X ]Impulsivity  [ X ] Current or past mood disorder  [  ] Current or past psychotic disorder  [X  ] Current or past PTSD  [  ] Current or past ADHD  [  ] Current or past TBI  [X  ] Current or past cluster B personality disorder or traits  [  ] Current or past conduct problems  [  ] Recent onset of current or past psychiatric disorder  [  ] Family history of psychiatric diagnoses requiring hospitalization  Additional Activating Events (select all that apply)  [  ]Perceived burden on family or others  [  ]Current sexual or physical abuse  [  ]Substance intoxication or withdrawal  [ X ]Inadequate social supports  [  ]Hopeless about or dissatisfied with current provider or treatment  Additional Protective Factors (select all that apply)  [  ] Future plans  [  ] Caodaism beliefs  [  ] Beloved pets  Suicide risk factors include mood episode, agitation/severe anxiety, chronic pain/acute medical problems, access to means, history of trauma, anhedonia, impulsivity, cluster B personality traits, strained or limited social supports, housing problems, economic problems, ongoing legal problems.

## 2024-04-04 NOTE — BH CONSULTATION LIAISON ASSESSMENT NOTE - PATIENT'S CHIEF COMPLAINT
"It's been a really hard year, I hate my parents, I can't sleep, my anxiety is so high, I can't heal like this."
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
## 2024-04-04 NOTE — PROVIDER CONTACT NOTE (OTHER) - SITUATION
38 year old female s/p reveision of lumbar decompression and fusion of L4-5 on 4/3  PCApump dc 4/4 with low blood pressure ---> 78/45 and 86/50  1L bolus given during the day and recheck of BP 91/45
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
## 2024-04-04 NOTE — OCCUPATIONAL THERAPY INITIAL EVALUATION ADULT - NSACTIVITYREC_GEN_A_OT
Patient educated on spinal  precautions and how to participate in dressing tasks, grooming tasks, toileting tasks, bed mobility and  all functional transfers while abiding by precautions. Handout provided to promote carryover. Pt educated on DME and AE throughout session and how to self purchase

## 2024-04-04 NOTE — PHYSICAL THERAPY INITIAL EVALUATION ADULT - GENERAL OBSERVATIONS, REHAB EVAL
Pt rec'd in bed on 5S, crying c/o anxiety and back pain - PCA pump not sufficient as per pt, +CT drain x 2, +mckeon, +PIV/PCA. RN cleared pt for PT evaluation. Co-tx with OT.
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
## 2024-04-04 NOTE — BH CONSULTATION LIAISON ASSESSMENT NOTE - CURRENT MEDICATION
MEDICATIONS  (STANDING):  acetaminophen   IVPB .. 1000 milliGRAM(s) IV Intermittent once  cyclobenzaprine 10 milliGRAM(s) Oral three times a day  melatonin 5 milliGRAM(s) Oral at bedtime  multivitamin 1 Tablet(s) Oral daily  oxyCODONE  ER Tablet 10 milliGRAM(s) Oral every 12 hours  pantoprazole    Tablet 40 milliGRAM(s) Oral before breakfast  polyethylene glycol 3350 17 Gram(s) Oral at bedtime  senna 2 Tablet(s) Oral at bedtime  spironolactone 50 milliGRAM(s) Oral daily  tranexamic acid Infusion 1 mG/kG/Hr (28.6 mL/Hr) IV Continuous <Continuous>  tranexamic acid IVPB 1000 milliGRAM(s) IV Intermittent once  traZODone 50 milliGRAM(s) Oral at bedtime  valACYclovir 500 milliGRAM(s) Oral two times a day    MEDICATIONS  (PRN):  ALPRAZolam 0.5 milliGRAM(s) Oral at bedtime PRN Anxiety  HYDROmorphone  Injectable 0.5 milliGRAM(s) IV Push every 4 hours PRN Severe Pain (7 - 10)  magnesium hydroxide Suspension 30 milliLiter(s) Oral daily PRN Constipation  naloxone Injectable 0.1 milliGRAM(s) IV Push every 3 minutes PRN For ANY of the following changes in patient status:  A. RR LESS THAN 10 breaths per minute, B. Oxygen saturation LESS THAN 90%, C. Sedation score of 6  ondansetron Injectable 4 milliGRAM(s) IV Push every 6 hours PRN Nausea  oxyCODONE    IR 10 milliGRAM(s) Oral every 4 hours PRN Moderate Pain (4 - 6)  oxyCODONE    IR 5 milliGRAM(s) Oral every 4 hours PRN Mild Pain (1 - 3)  trimethobenzamide Injectable 200 milliGRAM(s) IntraMuscular four times a day PRN Nausea and/or Vomiting

## 2024-04-04 NOTE — BH CONSULTATION LIAISON ASSESSMENT NOTE - NSBHATTESTAPPBILLTIME_PSY_A_CORE
I attest my time as CHRIS is greater than 50% of the total combined time spent on qualifying patient care activities. I have reviewed and verified the documentation.
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
## 2024-04-04 NOTE — PHYSICAL THERAPY INITIAL EVALUATION ADULT - BRACE/ORTHOTICS
Pt has RLE AFO, however does not have sneakers - advised to have friend bring in shoes to trial brace/LSO

## 2024-04-04 NOTE — CHART NOTE - NSCHARTNOTEFT_GEN_A_CORE
Orthopaedic team contacted regarding continued pain on dilaudid PCA. Patient seen and examined at bedside. Notes that pain is located around her back wound as well as radiating down both legs similar to preoperative pain. Denies fevers or chills. Patient POD1 from revision lumbar procedure. At this time, exam unchanged from prior. Notes that dilaudid PCA makes her nauseous and doesn't help that much with pain. Would prefer to try morphine pump similar to her last procedure. At this time, will order flexeril for spasms and antiemetic. Questions and concerns answered and addressed. Will discuss with Dr. Yanes and advise with any changes to the plan.

## 2024-04-04 NOTE — OCCUPATIONAL THERAPY INITIAL EVALUATION ADULT - GENERAL OBSERVATIONS, REHAB EVAL
Pt received semi-supine in bed, vss, nad. Pt left as received, positioned comfortably, call bell within reach.

## 2024-04-04 NOTE — BH CONSULTATION LIAISON ASSESSMENT NOTE - DESCRIPTION
Sign out Provider: Mahendra      Sign out Plan: 12-year-old patient who has been seen on an earlier shift.  Awaiting available bed placement.      Reassessment: Patient's mother requests morning meds which were given.      Disposition: Continue to await bed placement.       Nba Newman MD  12/11/19 [id number]     Pt domiciled with parents

## 2024-04-04 NOTE — PHYSICAL THERAPY INITIAL EVALUATION ADULT - MODALITIES TREATMENT COMMENTS
Left as found with pillows positioned to comfort, declined chair due to current anxiety but agreeable to attempt later, all lines intact and RN aware. +bed alarm/ call bell in reach

## 2024-04-04 NOTE — BH CONSULTATION LIAISON ASSESSMENT NOTE - NSBHCONSULTWORKUP_PSY_A_CORE
Pt presents to the ED , she was just at assisted living and now living at home with daughter. Pt has a home health care nurse. Pt gotten her esophagus  closed up about two years ago and normally has clear drainage, when she left Monday it had clear drainage. Now the drainage is black and has pus. Pt has a chest tube per daughter that is draining the tube is due to chronic pleural effusions. Pt has a stoma with a  G-Tube that is red, and irritated. Pt on the monitor.    no

## 2024-04-04 NOTE — BH CONSULTATION LIAISON ASSESSMENT NOTE - PAST PSYCHOTROPIC MEDICATION
Risperdal (says it made her breast discharge), lithium (says it made her blind for a few seconds).
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
## 2024-04-04 NOTE — BH CONSULTATION LIAISON ASSESSMENT NOTE - NSICDXPASTMEDICALHX_GEN_ALL_CORE_FT
PAST MEDICAL HISTORY:  Anxiety     Asthma     Borderline personality disorder     Cyclic vomiting syndrome     E. coli urinary tract infection on cipro    Foot drop, right     Herniated nucleus pulposus, L4-5     Herpes     History of endometriosis     Lumbar radiculopathy     Lumbar stenosis with neurogenic claudication     Marijuana smoker     Migraines     Nicotine vapor product user     Post traumatic stress disorder (PTSD)     Roundworm infection

## 2024-04-05 LAB
ANION GAP SERPL CALC-SCNC: 4 MMOL/L — LOW (ref 5–17)
BUN SERPL-MCNC: 6 MG/DL — LOW (ref 7–23)
CALCIUM SERPL-MCNC: 7.8 MG/DL — LOW (ref 8.5–10.1)
CHLORIDE SERPL-SCNC: 113 MMOL/L — HIGH (ref 96–108)
CO2 SERPL-SCNC: 28 MMOL/L — SIGNIFICANT CHANGE UP (ref 22–31)
CREAT SERPL-MCNC: 0.52 MG/DL — SIGNIFICANT CHANGE UP (ref 0.5–1.3)
EGFR: 123 ML/MIN/1.73M2 — SIGNIFICANT CHANGE UP
GLUCOSE SERPL-MCNC: 99 MG/DL — SIGNIFICANT CHANGE UP (ref 70–99)
HCT VFR BLD CALC: 24.5 % — LOW (ref 34.5–45)
HGB BLD-MCNC: 7.9 G/DL — LOW (ref 11.5–15.5)
MCHC RBC-ENTMCNC: 31 PG — SIGNIFICANT CHANGE UP (ref 27–34)
MCHC RBC-ENTMCNC: 32.2 GM/DL — SIGNIFICANT CHANGE UP (ref 32–36)
MCV RBC AUTO: 96.1 FL — SIGNIFICANT CHANGE UP (ref 80–100)
PLATELET # BLD AUTO: 141 K/UL — LOW (ref 150–400)
POTASSIUM SERPL-MCNC: 3.4 MMOL/L — LOW (ref 3.5–5.3)
POTASSIUM SERPL-SCNC: 3.4 MMOL/L — LOW (ref 3.5–5.3)
RBC # BLD: 2.55 M/UL — LOW (ref 3.8–5.2)
RBC # FLD: 12.5 % — SIGNIFICANT CHANGE UP (ref 10.3–14.5)
SODIUM SERPL-SCNC: 145 MMOL/L — SIGNIFICANT CHANGE UP (ref 135–145)
WBC # BLD: 4.4 K/UL — SIGNIFICANT CHANGE UP (ref 3.8–10.5)
WBC # FLD AUTO: 4.4 K/UL — SIGNIFICANT CHANGE UP (ref 3.8–10.5)

## 2024-04-05 RX ORDER — CIPROFLOXACIN LACTATE 400MG/40ML
500 VIAL (ML) INTRAVENOUS ONCE
Refills: 0 | Status: COMPLETED | OUTPATIENT
Start: 2024-04-05 | End: 2024-04-05

## 2024-04-05 RX ADMIN — OXYCODONE HYDROCHLORIDE 10 MILLIGRAM(S): 5 TABLET ORAL at 17:17

## 2024-04-05 RX ADMIN — HYDROMORPHONE HYDROCHLORIDE 0.5 MILLIGRAM(S): 2 INJECTION INTRAMUSCULAR; INTRAVENOUS; SUBCUTANEOUS at 01:33

## 2024-04-05 RX ADMIN — Medication 400 MILLIGRAM(S): at 11:14

## 2024-04-05 RX ADMIN — HYDROMORPHONE HYDROCHLORIDE 0.5 MILLIGRAM(S): 2 INJECTION INTRAMUSCULAR; INTRAVENOUS; SUBCUTANEOUS at 15:02

## 2024-04-05 RX ADMIN — HYDROMORPHONE HYDROCHLORIDE 0.5 MILLIGRAM(S): 2 INJECTION INTRAMUSCULAR; INTRAVENOUS; SUBCUTANEOUS at 20:46

## 2024-04-05 RX ADMIN — Medication 5 MILLIGRAM(S): at 20:45

## 2024-04-05 RX ADMIN — CYCLOBENZAPRINE HYDROCHLORIDE 10 MILLIGRAM(S): 10 TABLET, FILM COATED ORAL at 05:48

## 2024-04-05 RX ADMIN — Medication 1000 MILLIGRAM(S): at 02:27

## 2024-04-05 RX ADMIN — VALACYCLOVIR 500 MILLIGRAM(S): 500 TABLET, FILM COATED ORAL at 20:46

## 2024-04-05 RX ADMIN — VALACYCLOVIR 500 MILLIGRAM(S): 500 TABLET, FILM COATED ORAL at 09:18

## 2024-04-05 RX ADMIN — SENNA PLUS 2 TABLET(S): 8.6 TABLET ORAL at 20:45

## 2024-04-05 RX ADMIN — HYDROMORPHONE HYDROCHLORIDE 0.5 MILLIGRAM(S): 2 INJECTION INTRAMUSCULAR; INTRAVENOUS; SUBCUTANEOUS at 05:48

## 2024-04-05 RX ADMIN — CYCLOBENZAPRINE HYDROCHLORIDE 10 MILLIGRAM(S): 10 TABLET, FILM COATED ORAL at 20:45

## 2024-04-05 RX ADMIN — Medication 500 MILLIGRAM(S): at 17:17

## 2024-04-05 RX ADMIN — Medication 50 MILLIGRAM(S): at 20:46

## 2024-04-05 RX ADMIN — HYDROMORPHONE HYDROCHLORIDE 0.5 MILLIGRAM(S): 2 INJECTION INTRAMUSCULAR; INTRAVENOUS; SUBCUTANEOUS at 06:03

## 2024-04-05 RX ADMIN — SODIUM CHLORIDE 100 MILLILITER(S): 9 INJECTION INTRAMUSCULAR; INTRAVENOUS; SUBCUTANEOUS at 05:48

## 2024-04-05 RX ADMIN — SPIRONOLACTONE 50 MILLIGRAM(S): 25 TABLET, FILM COATED ORAL at 09:21

## 2024-04-05 RX ADMIN — PANTOPRAZOLE SODIUM 40 MILLIGRAM(S): 20 TABLET, DELAYED RELEASE ORAL at 05:48

## 2024-04-05 RX ADMIN — OXYCODONE HYDROCHLORIDE 10 MILLIGRAM(S): 5 TABLET ORAL at 09:18

## 2024-04-05 RX ADMIN — HYDROMORPHONE HYDROCHLORIDE 0.5 MILLIGRAM(S): 2 INJECTION INTRAMUSCULAR; INTRAVENOUS; SUBCUTANEOUS at 10:31

## 2024-04-05 RX ADMIN — Medication 1 TABLET(S): at 09:18

## 2024-04-05 RX ADMIN — POLYETHYLENE GLYCOL 3350 17 GRAM(S): 17 POWDER, FOR SOLUTION ORAL at 20:46

## 2024-04-05 RX ADMIN — Medication 400 MILLIGRAM(S): at 00:12

## 2024-04-05 RX ADMIN — HYDROMORPHONE HYDROCHLORIDE 0.5 MILLIGRAM(S): 2 INJECTION INTRAMUSCULAR; INTRAVENOUS; SUBCUTANEOUS at 21:15
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
## 2024-04-05 NOTE — PROGRESS NOTE ADULT - ASSESSMENT
38 year old Female POD#2 L3-4 fusion, L4-5 laminectomy, discectomy, posterior fusion with instrumentation. Patient with noted increased bouts of anxiety and expected post operative pain     PLAN:  Advance diet and activity as tolerated   Continue to trend CT output as per plastics  Pain meds as needed   Oxycontin 10 mg BID for sustained pain control  Oxycodone 5/10 for breakthrough pain   IV Dilaudid for 10/10 breakthrough pain   Flexeril for muscle relaxant  Xanax for Anxiety  OK for PT OOB with Brace  SCD for DVT ppx  Case was discussed with Dr. aYnes

## 2024-04-06 LAB
ANION GAP SERPL CALC-SCNC: 7 MMOL/L — SIGNIFICANT CHANGE UP (ref 5–17)
BUN SERPL-MCNC: 5 MG/DL — LOW (ref 7–23)
CALCIUM SERPL-MCNC: 8.4 MG/DL — LOW (ref 8.5–10.1)
CHLORIDE SERPL-SCNC: 105 MMOL/L — SIGNIFICANT CHANGE UP (ref 96–108)
CO2 SERPL-SCNC: 28 MMOL/L — SIGNIFICANT CHANGE UP (ref 22–31)
CREAT SERPL-MCNC: 0.47 MG/DL — LOW (ref 0.5–1.3)
EGFR: 126 ML/MIN/1.73M2 — SIGNIFICANT CHANGE UP
GLUCOSE SERPL-MCNC: 83 MG/DL — SIGNIFICANT CHANGE UP (ref 70–99)
HCT VFR BLD CALC: 28.7 % — LOW (ref 34.5–45)
HGB BLD-MCNC: 9.3 G/DL — LOW (ref 11.5–15.5)
MCHC RBC-ENTMCNC: 30.3 PG — SIGNIFICANT CHANGE UP (ref 27–34)
MCHC RBC-ENTMCNC: 32.4 GM/DL — SIGNIFICANT CHANGE UP (ref 32–36)
MCV RBC AUTO: 93.5 FL — SIGNIFICANT CHANGE UP (ref 80–100)
PLATELET # BLD AUTO: 171 K/UL — SIGNIFICANT CHANGE UP (ref 150–400)
POTASSIUM SERPL-MCNC: 3.3 MMOL/L — LOW (ref 3.5–5.3)
POTASSIUM SERPL-SCNC: 3.3 MMOL/L — LOW (ref 3.5–5.3)
RBC # BLD: 3.07 M/UL — LOW (ref 3.8–5.2)
RBC # FLD: 12.2 % — SIGNIFICANT CHANGE UP (ref 10.3–14.5)
SODIUM SERPL-SCNC: 140 MMOL/L — SIGNIFICANT CHANGE UP (ref 135–145)
WBC # BLD: 5.22 K/UL — SIGNIFICANT CHANGE UP (ref 3.8–10.5)
WBC # FLD AUTO: 5.22 K/UL — SIGNIFICANT CHANGE UP (ref 3.8–10.5)

## 2024-04-06 RX ORDER — POTASSIUM CHLORIDE 20 MEQ
10 PACKET (EA) ORAL
Refills: 0 | Status: COMPLETED | OUTPATIENT
Start: 2024-04-06 | End: 2024-04-06

## 2024-04-06 RX ORDER — POTASSIUM CHLORIDE 20 MEQ
10 PACKET (EA) ORAL EVERY 6 HOURS
Refills: 0 | Status: DISCONTINUED | OUTPATIENT
Start: 2024-04-06 | End: 2024-04-06

## 2024-04-06 RX ADMIN — VALACYCLOVIR 500 MILLIGRAM(S): 500 TABLET, FILM COATED ORAL at 21:10

## 2024-04-06 RX ADMIN — HYDROMORPHONE HYDROCHLORIDE 0.5 MILLIGRAM(S): 2 INJECTION INTRAMUSCULAR; INTRAVENOUS; SUBCUTANEOUS at 00:52

## 2024-04-06 RX ADMIN — Medication 100 MILLIEQUIVALENT(S): at 13:49

## 2024-04-06 RX ADMIN — HYDROMORPHONE HYDROCHLORIDE 0.5 MILLIGRAM(S): 2 INJECTION INTRAMUSCULAR; INTRAVENOUS; SUBCUTANEOUS at 17:19

## 2024-04-06 RX ADMIN — HYDROMORPHONE HYDROCHLORIDE 0.5 MILLIGRAM(S): 2 INJECTION INTRAMUSCULAR; INTRAVENOUS; SUBCUTANEOUS at 07:00

## 2024-04-06 RX ADMIN — HYDROMORPHONE HYDROCHLORIDE 0.5 MILLIGRAM(S): 2 INJECTION INTRAMUSCULAR; INTRAVENOUS; SUBCUTANEOUS at 13:10

## 2024-04-06 RX ADMIN — Medication 5 MILLIGRAM(S): at 21:10

## 2024-04-06 RX ADMIN — VALACYCLOVIR 500 MILLIGRAM(S): 500 TABLET, FILM COATED ORAL at 09:20

## 2024-04-06 RX ADMIN — Medication 1 TABLET(S): at 09:20

## 2024-04-06 RX ADMIN — Medication 100 MILLIEQUIVALENT(S): at 17:05

## 2024-04-06 RX ADMIN — OXYCODONE HYDROCHLORIDE 10 MILLIGRAM(S): 5 TABLET ORAL at 17:19

## 2024-04-06 RX ADMIN — CYCLOBENZAPRINE HYDROCHLORIDE 10 MILLIGRAM(S): 10 TABLET, FILM COATED ORAL at 13:10

## 2024-04-06 RX ADMIN — HYDROMORPHONE HYDROCHLORIDE 0.5 MILLIGRAM(S): 2 INJECTION INTRAMUSCULAR; INTRAVENOUS; SUBCUTANEOUS at 21:13

## 2024-04-06 RX ADMIN — SPIRONOLACTONE 50 MILLIGRAM(S): 25 TABLET, FILM COATED ORAL at 09:20

## 2024-04-06 RX ADMIN — CYCLOBENZAPRINE HYDROCHLORIDE 10 MILLIGRAM(S): 10 TABLET, FILM COATED ORAL at 06:06

## 2024-04-06 RX ADMIN — Medication 50 MILLIGRAM(S): at 21:10

## 2024-04-06 RX ADMIN — HYDROMORPHONE HYDROCHLORIDE 0.5 MILLIGRAM(S): 2 INJECTION INTRAMUSCULAR; INTRAVENOUS; SUBCUTANEOUS at 17:04

## 2024-04-06 RX ADMIN — HYDROMORPHONE HYDROCHLORIDE 0.5 MILLIGRAM(S): 2 INJECTION INTRAMUSCULAR; INTRAVENOUS; SUBCUTANEOUS at 01:36

## 2024-04-06 RX ADMIN — HYDROMORPHONE HYDROCHLORIDE 0.5 MILLIGRAM(S): 2 INJECTION INTRAMUSCULAR; INTRAVENOUS; SUBCUTANEOUS at 13:54

## 2024-04-06 RX ADMIN — OXYCODONE HYDROCHLORIDE 10 MILLIGRAM(S): 5 TABLET ORAL at 06:57

## 2024-04-06 RX ADMIN — POLYETHYLENE GLYCOL 3350 17 GRAM(S): 17 POWDER, FOR SOLUTION ORAL at 21:10

## 2024-04-06 RX ADMIN — HYDROMORPHONE HYDROCHLORIDE 0.5 MILLIGRAM(S): 2 INJECTION INTRAMUSCULAR; INTRAVENOUS; SUBCUTANEOUS at 06:06

## 2024-04-06 RX ADMIN — OXYCODONE HYDROCHLORIDE 10 MILLIGRAM(S): 5 TABLET ORAL at 06:06

## 2024-04-06 RX ADMIN — OXYCODONE HYDROCHLORIDE 10 MILLIGRAM(S): 5 TABLET ORAL at 17:08

## 2024-04-06 RX ADMIN — SENNA PLUS 2 TABLET(S): 8.6 TABLET ORAL at 21:10

## 2024-04-06 RX ADMIN — CYCLOBENZAPRINE HYDROCHLORIDE 10 MILLIGRAM(S): 10 TABLET, FILM COATED ORAL at 21:11

## 2024-04-06 RX ADMIN — PANTOPRAZOLE SODIUM 40 MILLIGRAM(S): 20 TABLET, DELAYED RELEASE ORAL at 06:06

## 2024-04-06 RX ADMIN — HYDROMORPHONE HYDROCHLORIDE 0.5 MILLIGRAM(S): 2 INJECTION INTRAMUSCULAR; INTRAVENOUS; SUBCUTANEOUS at 21:40

## 2024-04-06 NOTE — PROGRESS NOTE ADULT - ASSESSMENT
s/p L4-5 laminectomy/PLIF, extension instrumentation - stable  --10mEq K+ IVP x 2 doses (cleared w/pharmacist)  --repeat CBC/BMP in am  --continue PT/mobilization w/AFO and lumbar bracing  --bowel regimen  --maintain drains  --encourage incentive spirometry  --DVT ppx w/SCDs

## 2024-04-06 NOTE — PROGRESS NOTE ADULT - SUBJECTIVE AND OBJECTIVE BOX
[facility]                                                           Orthopedic Spine Progress Note      POST OPERATIVE DAY #: 3  STATUS POST: L4-5 laminectomy/PLIF, extension instrumentation                Pre-Op Dx: Lumbar stenosis      Post-Op Dx:  Lumbar stenosis      SUBJECTIVE: Patient seen and examined, resting quietly in bed, received AFO yesterday, K+ 3.3    Current Pain Management:  [ ] PCA   [x] Po Analgesics [x] IM /IV Anagesics     Vital Signs Last 24 Hrs  T(C): 36.5 (06 Apr 2024 07:51), Max: 37 (05 Apr 2024 23:16)  T(F): 97.7 (06 Apr 2024 07:51), Max: 98.6 (05 Apr 2024 23:16)  HR: 67 (06 Apr 2024 07:51) (56 - 73)  BP: 100/58 (06 Apr 2024 07:51) (99/52 - 111/52)  BP(mean): --  RR: 18 (06 Apr 2024 07:51) (16 - 18)  SpO2: 99% (06 Apr 2024 07:51) (99% - 100%)    Parameters below as of 06 Apr 2024 07:51  Patient On (Oxygen Delivery Method): room air      I&O's Detail    05 Apr 2024 07:01  -  06 Apr 2024 07:00  --------------------------------------------------------  IN:  Total IN: 0 mL    OUT:    Bulb (mL): 125 mL    Bulb (mL): 190 mL  Total OUT: 315 mL    Total NET: -315 mL          OBJECTIVE:      Wound /Dressing: intact  Drains: 60/40cc last 12H, 190/125cc last 24H - kept  Lumbar ROM: not tested   Neurological: A/O x 3              Sensation: [x] intact to light touch  [ ] decreased:          Motor exam: [x]            [x] Lower ext.       Hip Flx    Quad   Hamstrg         TA         EHL       GS                              R        5/5        5/5        5/5             0/5        0/5        5/5                                      L         5/5        5/5        5/5             5/5        5/5        5/5                                                              [x] Vascular: intact           Tension Signs: none          Long Tract Findings: none       LABS:                        9.3    5.22  )-----------( 171      ( 06 Apr 2024 07:11 )             28.7     04-06    140  |  105  |  5<L>  ----------------------------<  83  3.3<L>   |  28  |  0.47<L>    Ca    8.4<L>      06 Apr 2024 07:11        Urinalysis Basic - ( 06 Apr 2024 07:11 )    Color: x / Appearance: x / SG: x / pH: x  Gluc: 83 mg/dL / Ketone: x  / Bili: x / Urobili: x   Blood: x / Protein: x / Nitrite: x   Leuk Esterase: x / RBC: x / WBC x   Sq Epi: x / Non Sq Epi: x / Bacteria: x                                                                           [facility]                                                           Orthopedic Spine Progress Note      POST OPERATIVE DAY #: 3  STATUS POST: L4-5 laminectomy/PLIF, extension instrumentation                Pre-Op Dx: Lumbar stenosis      Post-Op Dx:  Lumbar stenosis      SUBJECTIVE: Patient seen and examined, resting quietly in bed, received AFO yesterday, K+ 3.3    Current Pain Management:  [ ] PCA   [x] Po Analgesics [x] IM /IV Anagesics     Vital Signs Last 24 Hrs  T(C): 36.5 (06 Apr 2024 07:51), Max: 37 (05 Apr 2024 23:16)  T(F): 97.7 (06 Apr 2024 07:51), Max: 98.6 (05 Apr 2024 23:16)  HR: 67 (06 Apr 2024 07:51) (56 - 73)  BP: 100/58 (06 Apr 2024 07:51) (99/52 - 111/52)  BP(mean): --  RR: 18 (06 Apr 2024 07:51) (16 - 18)  SpO2: 99% (06 Apr 2024 07:51) (99% - 100%)    Parameters below as of 06 Apr 2024 07:51  Patient On (Oxygen Delivery Method): room air      I&O's Detail    05 Apr 2024 07:01  -  06 Apr 2024 07:00  --------------------------------------------------------  IN:  Total IN: 0 mL    OUT:    Bulb (mL): 125 mL    Bulb (mL): 190 mL  Total OUT: 315 mL    Total NET: -315 mL          OBJECTIVE:      Wound /Dressing: intact  Drains: 60/40cc last 12H, 190/125cc last 24H - kept  Lumbar ROM: not tested   Neurological: A/O x 3              Sensation: [x] intact to light touch  [ ] decreased:          Motor exam: [x]            [x] Lower ext.       Hip Flx    Quad   Hamstrg         TA         EHL       GS                              R        5/5        5/5        5/5             2/5        2/5        5/5                                      L         5/5        5/5        5/5             5/5        5/5        5/5                                                              [x] Vascular: intact           Tension Signs: none          Long Tract Findings: none       LABS:                        9.3    5.22  )-----------( 171      ( 06 Apr 2024 07:11 )             28.7     04-06    140  |  105  |  5<L>  ----------------------------<  83  3.3<L>   |  28  |  0.47<L>    Ca    8.4<L>      06 Apr 2024 07:11        Urinalysis Basic - ( 06 Apr 2024 07:11 )    Color: x / Appearance: x / SG: x / pH: x  Gluc: 83 mg/dL / Ketone: x  / Bili: x / Urobili: x   Blood: x / Protein: x / Nitrite: x   Leuk Esterase: x / RBC: x / WBC x   Sq Epi: x / Non Sq Epi: x / Bacteria: x                                                                           [facility]                                                           Orthopedic Spine Progress Note      POST OPERATIVE DAY #: 3  STATUS POST: L4-5 laminectomy/PLIF, extension instrumentation                Pre-Op Dx: Lumbar stenosis      Post-Op Dx:  Lumbar stenosis      SUBJECTIVE: Patient seen and examined, resting quietly in bed, received AFO yesterday, K+ 3.3    Current Pain Management:  [ ] PCA   [x] Po Analgesics [x] IM /IV Anagesics     Vital Signs Last 24 Hrs  T(C): 36.5 (06 Apr 2024 07:51), Max: 37 (05 Apr 2024 23:16)  T(F): 97.7 (06 Apr 2024 07:51), Max: 98.6 (05 Apr 2024 23:16)  HR: 67 (06 Apr 2024 07:51) (56 - 73)  BP: 100/58 (06 Apr 2024 07:51) (99/52 - 111/52)  BP(mean): --  RR: 18 (06 Apr 2024 07:51) (16 - 18)  SpO2: 99% (06 Apr 2024 07:51) (99% - 100%)    Parameters below as of 06 Apr 2024 07:51  Patient On (Oxygen Delivery Method): room air      I&O's Detail    05 Apr 2024 07:01  -  06 Apr 2024 07:00  --------------------------------------------------------  IN:  Total IN: 0 mL    OUT:    Bulb (mL): 125 mL    Bulb (mL): 190 mL  Total OUT: 315 mL    Total NET: -315 mL          OBJECTIVE:      Wound /Dressing: intact  Drains: 60/40cc last 12H, 190/125cc last 24H - kept  Lumbar ROM: not tested   Neurological: A/O x 3              Sensation: [x] intact to light touch  [ ] decreased:          Motor exam: [x]            [x] Lower ext.       Hip Flx    Quad   Hamstrg         TA         EHL       GS                                                         R        5/5        5/5        5/5             1/5        0/5       5-/5                                      L         5/5        5/5        5/5             5/5        5/5        5/5                                                              [x] Vascular: intact           Tension Signs: none          Long Tract Findings: none       LABS:                        9.3    5.22  )-----------( 171      ( 06 Apr 2024 07:11 )             28.7     04-06    140  |  105  |  5<L>  ----------------------------<  83  3.3<L>   |  28  |  0.47<L>    Ca    8.4<L>      06 Apr 2024 07:11        Urinalysis Basic - ( 06 Apr 2024 07:11 )    Color: x / Appearance: x / SG: x / pH: x  Gluc: 83 mg/dL / Ketone: x  / Bili: x / Urobili: x   Blood: x / Protein: x / Nitrite: x   Leuk Esterase: x / RBC: x / WBC x   Sq Epi: x / Non Sq Epi: x / Bacteria: x

## 2024-04-07 LAB
ANION GAP SERPL CALC-SCNC: 2 MMOL/L — LOW (ref 5–17)
APPEARANCE UR: CLEAR — SIGNIFICANT CHANGE UP
BACTERIA # UR AUTO: NEGATIVE /HPF — SIGNIFICANT CHANGE UP
BILIRUB UR-MCNC: NEGATIVE — SIGNIFICANT CHANGE UP
BUN SERPL-MCNC: 9 MG/DL — SIGNIFICANT CHANGE UP (ref 7–23)
CALCIUM SERPL-MCNC: 8.8 MG/DL — SIGNIFICANT CHANGE UP (ref 8.5–10.1)
CAST: 0 /LPF — SIGNIFICANT CHANGE UP (ref 0–4)
CHLORIDE SERPL-SCNC: 104 MMOL/L — SIGNIFICANT CHANGE UP (ref 96–108)
CO2 SERPL-SCNC: 32 MMOL/L — HIGH (ref 22–31)
COLOR SPEC: YELLOW — SIGNIFICANT CHANGE UP
CREAT SERPL-MCNC: 0.58 MG/DL — SIGNIFICANT CHANGE UP (ref 0.5–1.3)
DIFF PNL FLD: ABNORMAL
EGFR: 120 ML/MIN/1.73M2 — SIGNIFICANT CHANGE UP
GLUCOSE SERPL-MCNC: 99 MG/DL — SIGNIFICANT CHANGE UP (ref 70–99)
GLUCOSE UR QL: NEGATIVE MG/DL — SIGNIFICANT CHANGE UP
HCT VFR BLD CALC: 30.2 % — LOW (ref 34.5–45)
HGB BLD-MCNC: 9.9 G/DL — LOW (ref 11.5–15.5)
KETONES UR-MCNC: ABNORMAL MG/DL
LEUKOCYTE ESTERASE UR-ACNC: NEGATIVE — SIGNIFICANT CHANGE UP
MCHC RBC-ENTMCNC: 30.6 PG — SIGNIFICANT CHANGE UP (ref 27–34)
MCHC RBC-ENTMCNC: 32.8 GM/DL — SIGNIFICANT CHANGE UP (ref 32–36)
MCV RBC AUTO: 93.2 FL — SIGNIFICANT CHANGE UP (ref 80–100)
NITRITE UR-MCNC: NEGATIVE — SIGNIFICANT CHANGE UP
PH UR: 7 — SIGNIFICANT CHANGE UP (ref 5–8)
PLATELET # BLD AUTO: 191 K/UL — SIGNIFICANT CHANGE UP (ref 150–400)
POTASSIUM SERPL-MCNC: 3.8 MMOL/L — SIGNIFICANT CHANGE UP (ref 3.5–5.3)
POTASSIUM SERPL-SCNC: 3.8 MMOL/L — SIGNIFICANT CHANGE UP (ref 3.5–5.3)
PROT UR-MCNC: NEGATIVE MG/DL — SIGNIFICANT CHANGE UP
RBC # BLD: 3.24 M/UL — LOW (ref 3.8–5.2)
RBC # FLD: 12.1 % — SIGNIFICANT CHANGE UP (ref 10.3–14.5)
RBC CASTS # UR COMP ASSIST: 1 /HPF — SIGNIFICANT CHANGE UP (ref 0–4)
SODIUM SERPL-SCNC: 138 MMOL/L — SIGNIFICANT CHANGE UP (ref 135–145)
SP GR SPEC: 1 — SIGNIFICANT CHANGE UP (ref 1–1.03)
SQUAMOUS # UR AUTO: 3 /HPF — SIGNIFICANT CHANGE UP (ref 0–5)
UROBILINOGEN FLD QL: 0.2 MG/DL — SIGNIFICANT CHANGE UP (ref 0.2–1)
WBC # BLD: 5.23 K/UL — SIGNIFICANT CHANGE UP (ref 3.8–10.5)
WBC # FLD AUTO: 5.23 K/UL — SIGNIFICANT CHANGE UP (ref 3.8–10.5)
WBC UR QL: 0 /HPF — SIGNIFICANT CHANGE UP (ref 0–5)

## 2024-04-07 RX ADMIN — CYCLOBENZAPRINE HYDROCHLORIDE 10 MILLIGRAM(S): 10 TABLET, FILM COATED ORAL at 13:38

## 2024-04-07 RX ADMIN — CYCLOBENZAPRINE HYDROCHLORIDE 10 MILLIGRAM(S): 10 TABLET, FILM COATED ORAL at 20:57

## 2024-04-07 RX ADMIN — OXYCODONE HYDROCHLORIDE 10 MILLIGRAM(S): 5 TABLET ORAL at 15:51

## 2024-04-07 RX ADMIN — HYDROMORPHONE HYDROCHLORIDE 0.5 MILLIGRAM(S): 2 INJECTION INTRAMUSCULAR; INTRAVENOUS; SUBCUTANEOUS at 03:55

## 2024-04-07 RX ADMIN — OXYCODONE HYDROCHLORIDE 10 MILLIGRAM(S): 5 TABLET ORAL at 12:03

## 2024-04-07 RX ADMIN — PANTOPRAZOLE SODIUM 40 MILLIGRAM(S): 20 TABLET, DELAYED RELEASE ORAL at 10:46

## 2024-04-07 RX ADMIN — OXYCODONE HYDROCHLORIDE 10 MILLIGRAM(S): 5 TABLET ORAL at 05:40

## 2024-04-07 RX ADMIN — HYDROMORPHONE HYDROCHLORIDE 0.5 MILLIGRAM(S): 2 INJECTION INTRAMUSCULAR; INTRAVENOUS; SUBCUTANEOUS at 21:26

## 2024-04-07 RX ADMIN — Medication 50 MILLIGRAM(S): at 20:56

## 2024-04-07 RX ADMIN — HYDROMORPHONE HYDROCHLORIDE 0.5 MILLIGRAM(S): 2 INJECTION INTRAMUSCULAR; INTRAVENOUS; SUBCUTANEOUS at 20:56

## 2024-04-07 RX ADMIN — CYCLOBENZAPRINE HYDROCHLORIDE 10 MILLIGRAM(S): 10 TABLET, FILM COATED ORAL at 05:40

## 2024-04-07 RX ADMIN — OXYCODONE HYDROCHLORIDE 10 MILLIGRAM(S): 5 TABLET ORAL at 16:51

## 2024-04-07 RX ADMIN — Medication 1 TABLET(S): at 10:46

## 2024-04-07 RX ADMIN — VALACYCLOVIR 500 MILLIGRAM(S): 500 TABLET, FILM COATED ORAL at 20:56

## 2024-04-07 RX ADMIN — HYDROMORPHONE HYDROCHLORIDE 0.5 MILLIGRAM(S): 2 INJECTION INTRAMUSCULAR; INTRAVENOUS; SUBCUTANEOUS at 09:06

## 2024-04-07 RX ADMIN — OXYCODONE HYDROCHLORIDE 10 MILLIGRAM(S): 5 TABLET ORAL at 18:41

## 2024-04-07 RX ADMIN — Medication 5 MILLIGRAM(S): at 20:57

## 2024-04-07 RX ADMIN — SENNA PLUS 2 TABLET(S): 8.6 TABLET ORAL at 20:57

## 2024-04-07 RX ADMIN — MAGNESIUM HYDROXIDE 30 MILLILITER(S): 400 TABLET, CHEWABLE ORAL at 16:03

## 2024-04-07 RX ADMIN — OXYCODONE HYDROCHLORIDE 10 MILLIGRAM(S): 5 TABLET ORAL at 11:03

## 2024-04-07 RX ADMIN — HYDROMORPHONE HYDROCHLORIDE 0.5 MILLIGRAM(S): 2 INJECTION INTRAMUSCULAR; INTRAVENOUS; SUBCUTANEOUS at 13:54

## 2024-04-07 RX ADMIN — POLYETHYLENE GLYCOL 3350 17 GRAM(S): 17 POWDER, FOR SOLUTION ORAL at 20:57

## 2024-04-07 RX ADMIN — SPIRONOLACTONE 50 MILLIGRAM(S): 25 TABLET, FILM COATED ORAL at 10:46

## 2024-04-07 RX ADMIN — HYDROMORPHONE HYDROCHLORIDE 0.5 MILLIGRAM(S): 2 INJECTION INTRAMUSCULAR; INTRAVENOUS; SUBCUTANEOUS at 08:51

## 2024-04-07 RX ADMIN — HYDROMORPHONE HYDROCHLORIDE 0.5 MILLIGRAM(S): 2 INJECTION INTRAMUSCULAR; INTRAVENOUS; SUBCUTANEOUS at 13:39

## 2024-04-07 RX ADMIN — VALACYCLOVIR 500 MILLIGRAM(S): 500 TABLET, FILM COATED ORAL at 10:46

## 2024-04-07 NOTE — PROGRESS NOTE ADULT - ASSESSMENT
s/p L4-5 laminectomy/PLIF, extension instrumentation - stable  --continue PT/mobilization  --encourage incentive spirometry  --maintain drains  --bowel regimen  --DVT ppx w/SCDs  --discharge planning for Mon or Tues am s/p L4-5 laminectomy/PLIF, extension instrumentation - stable  --urinalysis (post Cipro for UTI)  --continue PT/mobilization  --encourage incentive spirometry  --maintain drains  --bowel regimen  --DVT ppx w/SCDs  --discharge planning for Mon or Tues am

## 2024-04-07 NOTE — PROGRESS NOTE ADULT - SUBJECTIVE AND OBJECTIVE BOX
[facility]                                                           Orthopedic Spine Progress Note      POST OPERATIVE DAY #: 4  STATUS POST: L4-5 laminectomy/PLIF, extension instrumentation                Pre-Op Dx: Lumbar stenosis      Post-Op Dx:  Lumbar stenosis      SUBJECTIVE: Patient seen and examined.     Current Pain Management:  [ ] PCA   [x] Po Analgesics [x] IM /IV Anagesics     Vital Signs Last 24 Hrs  T(C): 36.8 (07 Apr 2024 08:25), Max: 37.1 (06 Apr 2024 23:15)  T(F): 98.2 (07 Apr 2024 08:25), Max: 98.8 (06 Apr 2024 23:15)  HR: 69 (07 Apr 2024 08:25) (64 - 88)  BP: 100/61 (07 Apr 2024 08:25) (97/61 - 104/63)  BP(mean): 69 (07 Apr 2024 03:50) (69 - 69)  RR: 18 (07 Apr 2024 08:25) (17 - 18)  SpO2: 100% (07 Apr 2024 08:25) (100% - 100%)    Parameters below as of 07 Apr 2024 08:25  Patient On (Oxygen Delivery Method): room air      I&O's Detail    06 Apr 2024 07:01  -  07 Apr 2024 07:00  --------------------------------------------------------  IN:  Total IN: 0 mL    OUT:    Bulb (mL): 60 mL    Bulb (mL): 40 mL  Total OUT: 100 mL    Total NET: -100 mL          OBJECTIVE:       Wound /Dressing: intact  Drains: 60/40cc last 12H - kept  Lumbar ROM: not tested  Neurological: A/O x 3              Sensation: [x] intact to light touch  [ ] decreased:          Motor exam: [x]          [x] Lower ext.         Hip Flx    Quad   Hamstrg         TA        EHL        GS                              R        5/5        5/5        5/5             1/5        0/5       5-/5                                    L         5/5        5/5        5/5             5/5        5/5        5/5                                                                [x] Vascular: intact           Tension Signs: none          Long Tract Findings: none                                                 LABS:                        9.9    5.23  )-----------( 191      ( 07 Apr 2024 06:55 )             30.2     04-07    138  |  104  |  9   ----------------------------<  99  3.8   |  32<H>  |  0.58    Ca    8.8      07 Apr 2024 06:55        Urinalysis Basic - ( 07 Apr 2024 06:55 )    Color: x / Appearance: x / SG: x / pH: x  Gluc: 99 mg/dL / Ketone: x  / Bili: x / Urobili: x   Blood: x / Protein: x / Nitrite: x   Leuk Esterase: x / RBC: x / WBC x   Sq Epi: x / Non Sq Epi: x / Bacteria: x                                                                         [facility]                                                           Orthopedic Spine Progress Note      POST OPERATIVE DAY #: 4  STATUS POST: L4-5 laminectomy/PLIF, extension instrumentation                Pre-Op Dx: Lumbar stenosis      Post-Op Dx:  Lumbar stenosis      SUBJECTIVE: Patient seen and examined, ambulated some this am, c/o abdominal discomfort - +flatus, no BM, is on bowel regimen, still taking Dilaudid injections but trying to space further apart, K+ normalized, H/H improving, WBC wnl, completed Cipro for UTI yesterday - voiding but w/some discomfort     Current Pain Management:  [ ] PCA   [x] Po Analgesics [x] IM /IV Analgesics     Vital Signs Last 24 Hrs  T(C): 36.8 (07 Apr 2024 08:25), Max: 37.1 (06 Apr 2024 23:15)  T(F): 98.2 (07 Apr 2024 08:25), Max: 98.8 (06 Apr 2024 23:15)  HR: 69 (07 Apr 2024 08:25) (64 - 88)  BP: 100/61 (07 Apr 2024 08:25) (97/61 - 104/63)  BP(mean): 69 (07 Apr 2024 03:50) (69 - 69)  RR: 18 (07 Apr 2024 08:25) (17 - 18)  SpO2: 100% (07 Apr 2024 08:25) (100% - 100%)    Parameters below as of 07 Apr 2024 08:25  Patient On (Oxygen Delivery Method): room air      I&O's Detail    06 Apr 2024 07:01  -  07 Apr 2024 07:00  --------------------------------------------------------  IN:  Total IN: 0 mL    OUT:    Bulb (mL): 60 mL    Bulb (mL): 40 mL  Total OUT: 100 mL    Total NET: -100 mL          OBJECTIVE:       Wound /Dressing: intact  Drains: 60/40cc last 12H - kept  Lumbar ROM: not tested  Abdomen: soft, mild tenderness on palpation diffusely, +BS x 4 quads  Neurological: A/O x 3              Sensation: [x] intact to light touch  [ ] decreased:          Motor exam: [x]          [x] Lower ext.         Hip Flx    Quad   Hamstrg         TA        EHL        GS                              R        5/5        5/5        5/5             1/5        0/5       5-/5                                    L         5/5        5/5        5/5             5/5        5/5        5/5                                                                [x] Vascular: intact           Tension Signs: none          Long Tract Findings: none                                                 LABS:                        9.9    5.23  )-----------( 191      ( 07 Apr 2024 06:55 )             30.2     04-07    138  |  104  |  9   ----------------------------<  99  3.8   |  32<H>  |  0.58    Ca    8.8      07 Apr 2024 06:55        Urinalysis Basic - ( 07 Apr 2024 06:55 )    Color: x / Appearance: x / SG: x / pH: x  Gluc: 99 mg/dL / Ketone: x  / Bili: x / Urobili: x   Blood: x / Protein: x / Nitrite: x   Leuk Esterase: x / RBC: x / WBC x   Sq Epi: x / Non Sq Epi: x / Bacteria: x

## 2024-04-08 ENCOUNTER — TRANSCRIPTION ENCOUNTER (OUTPATIENT)
Age: 39
End: 2024-04-08

## 2024-04-08 LAB — SURGICAL PATHOLOGY STUDY: SIGNIFICANT CHANGE UP

## 2024-04-08 PROCEDURE — 99232 SBSQ HOSP IP/OBS MODERATE 35: CPT

## 2024-04-08 RX ORDER — OXYCODONE HYDROCHLORIDE 5 MG/1
15 TABLET ORAL EVERY 4 HOURS
Refills: 0 | Status: DISCONTINUED | OUTPATIENT
Start: 2024-04-08 | End: 2024-04-10

## 2024-04-08 RX ORDER — TRAZODONE HCL 50 MG
75 TABLET ORAL AT BEDTIME
Refills: 0 | Status: DISCONTINUED | OUTPATIENT
Start: 2024-04-08 | End: 2024-04-10

## 2024-04-08 RX ORDER — POLYETHYLENE GLYCOL 3350 17 G/17G
17 POWDER, FOR SOLUTION ORAL
Refills: 0 | Status: DISCONTINUED | OUTPATIENT
Start: 2024-04-08 | End: 2024-04-10

## 2024-04-08 RX ADMIN — SENNA PLUS 2 TABLET(S): 8.6 TABLET ORAL at 21:31

## 2024-04-08 RX ADMIN — Medication 5 MILLIGRAM(S): at 20:59

## 2024-04-08 RX ADMIN — OXYCODONE HYDROCHLORIDE 10 MILLIGRAM(S): 5 TABLET ORAL at 18:26

## 2024-04-08 RX ADMIN — CYCLOBENZAPRINE HYDROCHLORIDE 10 MILLIGRAM(S): 10 TABLET, FILM COATED ORAL at 05:22

## 2024-04-08 RX ADMIN — CYCLOBENZAPRINE HYDROCHLORIDE 10 MILLIGRAM(S): 10 TABLET, FILM COATED ORAL at 14:23

## 2024-04-08 RX ADMIN — POLYETHYLENE GLYCOL 3350 17 GRAM(S): 17 POWDER, FOR SOLUTION ORAL at 21:33

## 2024-04-08 RX ADMIN — OXYCODONE HYDROCHLORIDE 15 MILLIGRAM(S): 5 TABLET ORAL at 22:36

## 2024-04-08 RX ADMIN — Medication 1 TABLET(S): at 11:18

## 2024-04-08 RX ADMIN — PANTOPRAZOLE SODIUM 40 MILLIGRAM(S): 20 TABLET, DELAYED RELEASE ORAL at 11:20

## 2024-04-08 RX ADMIN — SPIRONOLACTONE 50 MILLIGRAM(S): 25 TABLET, FILM COATED ORAL at 11:18

## 2024-04-08 RX ADMIN — OXYCODONE HYDROCHLORIDE 5 MILLIGRAM(S): 5 TABLET ORAL at 20:58

## 2024-04-08 RX ADMIN — POLYETHYLENE GLYCOL 3350 17 GRAM(S): 17 POWDER, FOR SOLUTION ORAL at 11:18

## 2024-04-08 RX ADMIN — OXYCODONE HYDROCHLORIDE 15 MILLIGRAM(S): 5 TABLET ORAL at 15:22

## 2024-04-08 RX ADMIN — HYDROMORPHONE HYDROCHLORIDE 0.5 MILLIGRAM(S): 2 INJECTION INTRAMUSCULAR; INTRAVENOUS; SUBCUTANEOUS at 06:34

## 2024-04-08 RX ADMIN — VALACYCLOVIR 500 MILLIGRAM(S): 500 TABLET, FILM COATED ORAL at 20:57

## 2024-04-08 RX ADMIN — OXYCODONE HYDROCHLORIDE 10 MILLIGRAM(S): 5 TABLET ORAL at 08:11

## 2024-04-08 RX ADMIN — CYCLOBENZAPRINE HYDROCHLORIDE 10 MILLIGRAM(S): 10 TABLET, FILM COATED ORAL at 20:58

## 2024-04-08 RX ADMIN — OXYCODONE HYDROCHLORIDE 10 MILLIGRAM(S): 5 TABLET ORAL at 05:22

## 2024-04-08 RX ADMIN — Medication 75 MILLIGRAM(S): at 20:59

## 2024-04-08 RX ADMIN — OXYCODONE HYDROCHLORIDE 15 MILLIGRAM(S): 5 TABLET ORAL at 14:22

## 2024-04-08 RX ADMIN — VALACYCLOVIR 500 MILLIGRAM(S): 500 TABLET, FILM COATED ORAL at 11:18

## 2024-04-08 RX ADMIN — HYDROMORPHONE HYDROCHLORIDE 0.5 MILLIGRAM(S): 2 INJECTION INTRAMUSCULAR; INTRAVENOUS; SUBCUTANEOUS at 11:23

## 2024-04-08 RX ADMIN — Medication 5 MILLIGRAM(S): at 11:18
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
## 2024-04-08 NOTE — DISCHARGE NOTE NURSING/CASE MANAGEMENT/SOCIAL WORK - NSDCPEEMAIL_GEN_ALL_CORE
[facility] for Tobacco Control email [email or web address]

## 2024-04-08 NOTE — BH CONSULTATION LIAISON PROGRESS NOTE - NSBHCHARTREVIEWVS_PSY_A_CORE FT
unk
Vital Signs Last 24 Hrs  T(C): 36.6 (08 Apr 2024 15:41), Max: 36.8 (07 Apr 2024 23:25)  T(F): 97.9 (08 Apr 2024 15:41), Max: 98.2 (07 Apr 2024 23:25)  HR: 86 (08 Apr 2024 15:41) (76 - 86)  BP: 89/48 (08 Apr 2024 15:41) (86/60 - 96/55)  BP(mean): --  RR: 18 (08 Apr 2024 15:41) (18 - 20)  SpO2: 99% (08 Apr 2024 15:41) (98% - 100%)    Parameters below as of 08 Apr 2024 15:41  Patient On (Oxygen Delivery Method): room air

## 2024-04-08 NOTE — DISCHARGE NOTE NURSING/CASE MANAGEMENT/SOCIAL WORK - NSDCPEWEB_GEN_ALL_CORE
[facility] for Tobacco Control website --- [email or web address] website --- [email or web address]

## 2024-04-08 NOTE — PROGRESS NOTE ADULT - ASSESSMENT
38 year old Female POD#5 L3-4 fusion, L4-5 laminectomy, discectomy, posterior fusion with instrumentation.     PLAN:  Advance diet and activity as tolerated   Continue to trend CT output as per plastics  Pain meds as needed   Oxycontin 10 mg BID for sustained pain control  Oxycodone 5/10 for breakthrough pain   IV Dilaudid for 10/10 breakthrough pain   PT OOB with Brace  Flexeril for muscle relaxant  Bowel regimen increased  SCDs for DVT ppx    Case was discussed with Dr. Yanes

## 2024-04-08 NOTE — DISCHARGE NOTE NURSING/CASE MANAGEMENT/SOCIAL WORK - NSDCPEFALRISK_GEN_ALL_CORE
For information on Fall & Injury Prevention, visit: [email or web address] OR  [email or web address] OR  https://www.cdc.gov/steadi/patient.html

## 2024-04-08 NOTE — PROGRESS NOTE ADULT - ASSESSMENT
Would continue drains for now  Consider discharging the patient home with one or two drains if she is otherwise cleared.
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
## 2024-04-08 NOTE — DISCHARGE NOTE NURSING/CASE MANAGEMENT/SOCIAL WORK - PATIENT PORTAL LINK FT
You can access the FollowMyHealth Patient Portal offered by [facility] by registering at the following website: [email or web address]. By joining [facility]’s FollowMyHealth portal, you will also be able to view your health information using other applications (apps) compatible with our system.

## 2024-04-08 NOTE — BH CONSULTATION LIAISON PROGRESS NOTE - NSBHFUPINTERVALHXFT_PSY_A_CORE
Patient states that she cannot sleep in hospital.  She is getting trazodone 50 mg she says is not enough.  Sleep is interrupted.  She denies being depressed but reports being anxious.  She also denies being traumatized by her parents who are micromanaging everything.  She also says she moved to [place] and she resides in [place] just came back from the surgery.  She denies hearing voices or seeing things.  She is willing to take trazodone may be on high-dose because 50 mg at night for her.  However she is also on painkillers that can potentiate oversedation.

## 2024-04-08 NOTE — BH CONSULTATION LIAISON PROGRESS NOTE - CURRENT MEDICATION
MEDICATIONS  (STANDING):  cyclobenzaprine 10 milliGRAM(s) Oral three times a day  melatonin 5 milliGRAM(s) Oral at bedtime  multivitamin 1 Tablet(s) Oral daily  oxyCODONE  ER Tablet 10 milliGRAM(s) Oral every 12 hours  pantoprazole    Tablet 40 milliGRAM(s) Oral before breakfast  polyethylene glycol 3350 17 Gram(s) Oral two times a day  senna 2 Tablet(s) Oral at bedtime  sodium chloride 0.9%. 1000 milliLiter(s) (100 mL/Hr) IV Continuous <Continuous>  spironolactone 50 milliGRAM(s) Oral daily  tranexamic acid Infusion 1 mG/kG/Hr (28.6 mL/Hr) IV Continuous <Continuous>  tranexamic acid IVPB 1000 milliGRAM(s) IV Intermittent once  traZODone 75 milliGRAM(s) Oral at bedtime  valACYclovir 500 milliGRAM(s) Oral two times a day    MEDICATIONS  (PRN):  bisacodyl 5 milliGRAM(s) Oral every 12 hours PRN Constipation  magnesium hydroxide Suspension 30 milliLiter(s) Oral daily PRN Constipation  naloxone Injectable 0.1 milliGRAM(s) IV Push every 3 minutes PRN For ANY of the following changes in patient status:  A. RR LESS THAN 10 breaths per minute, B. Oxygen saturation LESS THAN 90%, C. Sedation score of 6  ondansetron Injectable 4 milliGRAM(s) IV Push every 6 hours PRN Nausea  oxyCODONE    IR 15 milliGRAM(s) Oral every 4 hours PRN Severe Pain (7 - 10)  oxyCODONE    IR 5 milliGRAM(s) Oral every 4 hours PRN Mild Pain (1 - 3)  oxyCODONE    IR 10 milliGRAM(s) Oral every 4 hours PRN Moderate Pain (4 - 6)  trimethobenzamide Injectable 200 milliGRAM(s) IntraMuscular four times a day PRN Nausea and/or Vomiting

## 2024-04-09 LAB
ANION GAP SERPL CALC-SCNC: 2 MMOL/L — LOW (ref 5–17)
BUN SERPL-MCNC: 7 MG/DL — SIGNIFICANT CHANGE UP (ref 7–23)
CALCIUM SERPL-MCNC: 8.8 MG/DL — SIGNIFICANT CHANGE UP (ref 8.5–10.1)
CHLORIDE SERPL-SCNC: 104 MMOL/L — SIGNIFICANT CHANGE UP (ref 96–108)
CO2 SERPL-SCNC: 32 MMOL/L — HIGH (ref 22–31)
CREAT SERPL-MCNC: 0.66 MG/DL — SIGNIFICANT CHANGE UP (ref 0.5–1.3)
EGFR: 117 ML/MIN/1.73M2 — SIGNIFICANT CHANGE UP
GLUCOSE SERPL-MCNC: 90 MG/DL — SIGNIFICANT CHANGE UP (ref 70–99)
HCT VFR BLD CALC: 30.4 % — LOW (ref 34.5–45)
HGB BLD-MCNC: 9.8 G/DL — LOW (ref 11.5–15.5)
MCHC RBC-ENTMCNC: 30.5 PG — SIGNIFICANT CHANGE UP (ref 27–34)
MCHC RBC-ENTMCNC: 32.2 GM/DL — SIGNIFICANT CHANGE UP (ref 32–36)
MCV RBC AUTO: 94.7 FL — SIGNIFICANT CHANGE UP (ref 80–100)
PLATELET # BLD AUTO: 232 K/UL — SIGNIFICANT CHANGE UP (ref 150–400)
POTASSIUM SERPL-MCNC: 3.7 MMOL/L — SIGNIFICANT CHANGE UP (ref 3.5–5.3)
POTASSIUM SERPL-SCNC: 3.7 MMOL/L — SIGNIFICANT CHANGE UP (ref 3.5–5.3)
RBC # BLD: 3.21 M/UL — LOW (ref 3.8–5.2)
RBC # FLD: 12.4 % — SIGNIFICANT CHANGE UP (ref 10.3–14.5)
SODIUM SERPL-SCNC: 138 MMOL/L — SIGNIFICANT CHANGE UP (ref 135–145)
WBC # BLD: 4.59 K/UL — SIGNIFICANT CHANGE UP (ref 3.8–10.5)
WBC # FLD AUTO: 4.59 K/UL — SIGNIFICANT CHANGE UP (ref 3.8–10.5)

## 2024-04-09 PROCEDURE — 99233 SBSQ HOSP IP/OBS HIGH 50: CPT

## 2024-04-09 RX ORDER — PHENAZOPYRIDINE HCL 100 MG
100 TABLET ORAL EVERY 8 HOURS
Refills: 0 | Status: DISCONTINUED | OUTPATIENT
Start: 2024-04-09 | End: 2024-04-10

## 2024-04-09 RX ORDER — LACTULOSE 10 G/15ML
20 SOLUTION ORAL
Refills: 0 | Status: DISCONTINUED | OUTPATIENT
Start: 2024-04-09 | End: 2024-04-10

## 2024-04-09 RX ORDER — ACETAMINOPHEN 500 MG
975 TABLET ORAL EVERY 8 HOURS
Refills: 0 | Status: DISCONTINUED | OUTPATIENT
Start: 2024-04-09 | End: 2024-04-10

## 2024-04-09 RX ORDER — NALOXEGOL OXALATE 12.5 MG/1
25 TABLET, FILM COATED ORAL DAILY
Refills: 0 | Status: DISCONTINUED | OUTPATIENT
Start: 2024-04-09 | End: 2024-04-10

## 2024-04-09 RX ADMIN — PANTOPRAZOLE SODIUM 40 MILLIGRAM(S): 20 TABLET, DELAYED RELEASE ORAL at 05:05

## 2024-04-09 RX ADMIN — CYCLOBENZAPRINE HYDROCHLORIDE 10 MILLIGRAM(S): 10 TABLET, FILM COATED ORAL at 14:16

## 2024-04-09 RX ADMIN — OXYCODONE HYDROCHLORIDE 15 MILLIGRAM(S): 5 TABLET ORAL at 05:04

## 2024-04-09 RX ADMIN — OXYCODONE HYDROCHLORIDE 15 MILLIGRAM(S): 5 TABLET ORAL at 10:00

## 2024-04-09 RX ADMIN — VALACYCLOVIR 500 MILLIGRAM(S): 500 TABLET, FILM COATED ORAL at 09:17

## 2024-04-09 RX ADMIN — POLYETHYLENE GLYCOL 3350 17 GRAM(S): 17 POWDER, FOR SOLUTION ORAL at 09:16

## 2024-04-09 RX ADMIN — CYCLOBENZAPRINE HYDROCHLORIDE 10 MILLIGRAM(S): 10 TABLET, FILM COATED ORAL at 05:05

## 2024-04-09 RX ADMIN — Medication 975 MILLIGRAM(S): at 21:30

## 2024-04-09 RX ADMIN — POLYETHYLENE GLYCOL 3350 17 GRAM(S): 17 POWDER, FOR SOLUTION ORAL at 21:12

## 2024-04-09 RX ADMIN — Medication 100 MILLIGRAM(S): at 14:16

## 2024-04-09 RX ADMIN — OXYCODONE HYDROCHLORIDE 10 MILLIGRAM(S): 5 TABLET ORAL at 05:05

## 2024-04-09 RX ADMIN — Medication 1 TABLET(S): at 09:16

## 2024-04-09 RX ADMIN — MAGNESIUM HYDROXIDE 30 MILLILITER(S): 400 TABLET, CHEWABLE ORAL at 12:09

## 2024-04-09 RX ADMIN — VALACYCLOVIR 500 MILLIGRAM(S): 500 TABLET, FILM COATED ORAL at 21:12

## 2024-04-09 RX ADMIN — Medication 75 MILLIGRAM(S): at 21:12

## 2024-04-09 RX ADMIN — SENNA PLUS 2 TABLET(S): 8.6 TABLET ORAL at 21:16

## 2024-04-09 RX ADMIN — ONDANSETRON 4 MILLIGRAM(S): 8 TABLET, FILM COATED ORAL at 14:50

## 2024-04-09 RX ADMIN — Medication 975 MILLIGRAM(S): at 14:17

## 2024-04-09 RX ADMIN — Medication 100 MILLIGRAM(S): at 21:14

## 2024-04-09 RX ADMIN — OXYCODONE HYDROCHLORIDE 15 MILLIGRAM(S): 5 TABLET ORAL at 09:16

## 2024-04-09 RX ADMIN — OXYCODONE HYDROCHLORIDE 15 MILLIGRAM(S): 5 TABLET ORAL at 17:03

## 2024-04-09 RX ADMIN — OXYCODONE HYDROCHLORIDE 15 MILLIGRAM(S): 5 TABLET ORAL at 16:17

## 2024-04-09 RX ADMIN — Medication 5 MILLIGRAM(S): at 21:15

## 2024-04-09 RX ADMIN — Medication 975 MILLIGRAM(S): at 21:14

## 2024-04-09 RX ADMIN — OXYCODONE HYDROCHLORIDE 15 MILLIGRAM(S): 5 TABLET ORAL at 12:09

## 2024-04-09 RX ADMIN — NALOXEGOL OXALATE 25 MILLIGRAM(S): 12.5 TABLET, FILM COATED ORAL at 18:51

## 2024-04-09 RX ADMIN — OXYCODONE HYDROCHLORIDE 15 MILLIGRAM(S): 5 TABLET ORAL at 12:45

## 2024-04-09 RX ADMIN — OXYCODONE HYDROCHLORIDE 10 MILLIGRAM(S): 5 TABLET ORAL at 17:21

## 2024-04-09 RX ADMIN — ONDANSETRON 4 MILLIGRAM(S): 8 TABLET, FILM COATED ORAL at 20:50

## 2024-04-09 RX ADMIN — SPIRONOLACTONE 50 MILLIGRAM(S): 25 TABLET, FILM COATED ORAL at 09:16

## 2024-04-09 RX ADMIN — CYCLOBENZAPRINE HYDROCHLORIDE 10 MILLIGRAM(S): 10 TABLET, FILM COATED ORAL at 21:15

## 2024-04-09 RX ADMIN — OXYCODONE HYDROCHLORIDE 15 MILLIGRAM(S): 5 TABLET ORAL at 20:47

## 2024-04-09 RX ADMIN — Medication 5 MILLIGRAM(S): at 09:17

## 2024-04-09 RX ADMIN — OXYCODONE HYDROCHLORIDE 10 MILLIGRAM(S): 5 TABLET ORAL at 17:31

## 2024-04-09 RX ADMIN — Medication 975 MILLIGRAM(S): at 14:28
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
## 2024-04-09 NOTE — DIETITIAN INITIAL EVALUATION ADULT - PERTINENT LABORATORY DATA
04-09    138  |  104  |  7   ----------------------------<  90  3.7   |  32<H>  |  0.66    Ca    8.8      09 Apr 2024 07:33    A1C with Estimated Average Glucose Result: 4.8 % (03-28-24 @ 13:57)

## 2024-04-09 NOTE — DIETITIAN INITIAL EVALUATION ADULT - REASON FOR ADMISSION
Unspecified open wound of lower back and pelvis without penetration into retroperitoneum, initial encounter    Status post arthrodesis    Spinal stenosis of lumbar region with neurogenic claudication

## 2024-04-09 NOTE — PROGRESS NOTE ADULT - SUBJECTIVE AND OBJECTIVE BOX
Patient seen and examined  Chart reviewed  case discussed with PA's    POD #6  Afeb  Drain output noted  Drains kept per Plastics    Main issue is pain control  Hx of opiate tolerance in past  Went to rehab to come off pain meds  C/O opiate induced constipation    Right foot drop persists  Otherwise neuro exam intact  Dsg dry  LE well perfused  Abd distended, non-tender focally     - POD  #6   - Bowel regime   - Oral pain meds   - Needs to mobilize and recover at home    ALONSO Yanes MD

## 2024-04-09 NOTE — DIETITIAN INITIAL EVALUATION ADULT - PERTINENT MEDS FT
MEDICATIONS  (STANDING):  acetaminophen     Tablet .. 975 milliGRAM(s) Oral every 8 hours  cyclobenzaprine 10 milliGRAM(s) Oral three times a day  melatonin 5 milliGRAM(s) Oral at bedtime  multivitamin 1 Tablet(s) Oral daily  oxyCODONE  ER Tablet 10 milliGRAM(s) Oral every 12 hours  pantoprazole    Tablet 40 milliGRAM(s) Oral before breakfast  phenazopyridine 100 milliGRAM(s) Oral every 8 hours  polyethylene glycol 3350 17 Gram(s) Oral two times a day  senna 2 Tablet(s) Oral at bedtime  sodium chloride 0.9%. 1000 milliLiter(s) (100 mL/Hr) IV Continuous <Continuous>  spironolactone 50 milliGRAM(s) Oral daily  tranexamic acid Infusion 1 mG/kG/Hr (28.6 mL/Hr) IV Continuous <Continuous>  tranexamic acid IVPB 1000 milliGRAM(s) IV Intermittent once  traZODone 75 milliGRAM(s) Oral at bedtime  valACYclovir 500 milliGRAM(s) Oral two times a day    MEDICATIONS  (PRN):  bisacodyl 5 milliGRAM(s) Oral every 12 hours PRN Constipation  magnesium hydroxide Suspension 30 milliLiter(s) Oral daily PRN Constipation  naloxone Injectable 0.1 milliGRAM(s) IV Push every 3 minutes PRN For ANY of the following changes in patient status:  A. RR LESS THAN 10 breaths per minute, B. Oxygen saturation LESS THAN 90%, C. Sedation score of 6  ondansetron Injectable 4 milliGRAM(s) IV Push every 6 hours PRN Nausea  oxyCODONE    IR 10 milliGRAM(s) Oral every 4 hours PRN Moderate Pain (4 - 6)  oxyCODONE    IR 5 milliGRAM(s) Oral every 4 hours PRN Mild Pain (1 - 3)  oxyCODONE    IR 15 milliGRAM(s) Oral every 4 hours PRN Severe Pain (7 - 10)  trimethobenzamide Injectable 200 milliGRAM(s) IntraMuscular four times a day PRN Nausea and/or Vomiting
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
## 2024-04-09 NOTE — DIETITIAN INITIAL EVALUATION ADULT - NSFNSGIIOFT_GEN_A_CORE
I&O's Detail    08 Apr 2024 07:01  -  09 Apr 2024 07:00  --------------------------------------------------------  IN:  Total IN: 0 mL    OUT:    Bulb (mL): 90 mL    Bulb (mL): 80 mL  Total OUT: 170 mL    Total NET: -170 mL

## 2024-04-09 NOTE — DIETITIAN INITIAL EVALUATION ADULT - ORAL INTAKE PTA/DIET HISTORY
Pt now lives at home w/ parents; came here from [place] for surgery. Reports decreased appetite, consumes small meals, and likely meeting <75% of ENN x 6 mo 2/2 persistent GI symptoms - abd pain, N/V, diarrhea/constipation. Follows low fodmap diet at home as rec'd by GI doctor.
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
## 2024-04-09 NOTE — PROGRESS NOTE ADULT - ASSESSMENT
38 year old Female POD#6 L3-4 fusion, L4-5 laminectomy, discectomy, posterior fusion with instrumentation.     PLAN:  Advance diet and activity as tolerated   Continue to trend CT output as per plastics  Pain meds as needed   Oxycontin 10 mg BID for sustained pain control  Oxycodone 5/10/15 for breakthrough pain   IV Dilaudid d/aaron yesterday  PT OOB with Brace  Patient encouraged to be OOB/mobilizing throughout the day  Incentive spirometer encouraged  Flexeril for muscle relaxant  Bowel regimen increased  SCDs for DVT ppx    Case was discussed with Dr. Yanes

## 2024-04-09 NOTE — DIETITIAN INITIAL EVALUATION ADULT - ADD RECOMMEND
1) C/w regular diet as tolerated  2) Add [employer] 1x/day (325kcal, 16 protein)  3) Monitor bowel movements, if no BM for >3 days, consider implementing bowel regimen.  4) Encourage protein-rich foods, maximize food preferences  5) MVI w/ minerals daily to ensure 100% RDA met  6) Consider adding thiamine 100 mg daily 2/2 poor PO intake  7) Monitor lytes/ min and replete prn.  8) Strongly rec'd GI evaluation due to reported persistent GI symptoms  9) Confirm goals of care regarding nutrition support  RD will continue to monitor PO intake, labs, hydration, and wt prn.

## 2024-04-10 ENCOUNTER — TRANSCRIPTION ENCOUNTER (OUTPATIENT)
Age: 39
End: 2024-04-10

## 2024-04-10 VITALS
HEART RATE: 98 BPM | TEMPERATURE: 97 F | RESPIRATION RATE: 18 BRPM | OXYGEN SATURATION: 98 % | SYSTOLIC BLOOD PRESSURE: 98 MMHG | DIASTOLIC BLOOD PRESSURE: 53 MMHG

## 2024-04-10 RX ORDER — OXYCODONE HYDROCHLORIDE 5 MG/1
1 TABLET ORAL
Qty: 28 | Refills: 0
Start: 2024-04-10 | End: 2024-04-16

## 2024-04-10 RX ORDER — CIPROFLOXACIN LACTATE 400MG/40ML
0 VIAL (ML) INTRAVENOUS
Refills: 0 | DISCHARGE

## 2024-04-10 RX ORDER — POLYETHYLENE GLYCOL 3350 17 G/17G
17 POWDER, FOR SOLUTION ORAL
Qty: 238 | Refills: 0
Start: 2024-04-10 | End: 2024-04-16

## 2024-04-10 RX ORDER — CYCLOBENZAPRINE HYDROCHLORIDE 10 MG/1
1 TABLET, FILM COATED ORAL
Qty: 42 | Refills: 0
Start: 2024-04-10 | End: 2024-04-23

## 2024-04-10 RX ORDER — NALOXEGOL OXALATE 12.5 MG/1
1 TABLET, FILM COATED ORAL
Qty: 10 | Refills: 0
Start: 2024-04-10 | End: 2024-04-19

## 2024-04-10 RX ORDER — CELECOXIB 200 MG/1
1 CAPSULE ORAL
Refills: 0 | DISCHARGE

## 2024-04-10 RX ORDER — OXYCODONE HYDROCHLORIDE 5 MG/1
1 TABLET ORAL
Qty: 14 | Refills: 0
Start: 2024-04-10 | End: 2024-04-16

## 2024-04-10 RX ADMIN — VALACYCLOVIR 500 MILLIGRAM(S): 500 TABLET, FILM COATED ORAL at 09:56

## 2024-04-10 RX ADMIN — CYCLOBENZAPRINE HYDROCHLORIDE 10 MILLIGRAM(S): 10 TABLET, FILM COATED ORAL at 05:24

## 2024-04-10 RX ADMIN — OXYCODONE HYDROCHLORIDE 10 MILLIGRAM(S): 5 TABLET ORAL at 05:24

## 2024-04-10 RX ADMIN — NALOXEGOL OXALATE 25 MILLIGRAM(S): 12.5 TABLET, FILM COATED ORAL at 09:56

## 2024-04-10 RX ADMIN — POLYETHYLENE GLYCOL 3350 17 GRAM(S): 17 POWDER, FOR SOLUTION ORAL at 10:04

## 2024-04-10 RX ADMIN — Medication 975 MILLIGRAM(S): at 14:11

## 2024-04-10 RX ADMIN — Medication 100 MILLIGRAM(S): at 14:11

## 2024-04-10 RX ADMIN — OXYCODONE HYDROCHLORIDE 15 MILLIGRAM(S): 5 TABLET ORAL at 09:56

## 2024-04-10 RX ADMIN — CYCLOBENZAPRINE HYDROCHLORIDE 10 MILLIGRAM(S): 10 TABLET, FILM COATED ORAL at 14:10

## 2024-04-10 RX ADMIN — OXYCODONE HYDROCHLORIDE 15 MILLIGRAM(S): 5 TABLET ORAL at 14:10

## 2024-04-10 RX ADMIN — Medication 1 TABLET(S): at 09:57

## 2024-04-10 RX ADMIN — OXYCODONE HYDROCHLORIDE 10 MILLIGRAM(S): 5 TABLET ORAL at 16:18

## 2024-04-10 RX ADMIN — SPIRONOLACTONE 50 MILLIGRAM(S): 25 TABLET, FILM COATED ORAL at 09:56

## 2024-04-10 RX ADMIN — PANTOPRAZOLE SODIUM 40 MILLIGRAM(S): 20 TABLET, DELAYED RELEASE ORAL at 05:24

## 2024-04-10 RX ADMIN — Medication 975 MILLIGRAM(S): at 05:25

## 2024-04-10 RX ADMIN — Medication 100 MILLIGRAM(S): at 05:24

## 2024-04-10 RX ADMIN — OXYCODONE HYDROCHLORIDE 15 MILLIGRAM(S): 5 TABLET ORAL at 05:24

## 2024-04-10 NOTE — DISCHARGE NOTE PROVIDER - NSDCMRMEDTOKEN_GEN_ALL_CORE_FT
bisacodyl 5 mg oral delayed release tablet: 2 tab(s) orally every 12 hours as needed for Constipation MDD: 4  cipro: one every 12 hours  cyclobenzaprine 10 mg oral tablet: 1 tab(s) orally 3 times a day as needed for  muscle spasm MDD: 3  Multiple Vitamins oral tablet: 1 tab(s) orally once a day MDD: 1  naloxegol 25 mg oral tablet: 1 tab(s) orally once a day continue until regular bowel movements MDD: 1  oxyCODONE 10 mg oral tablet, extended release: 1 tab(s) orally every 12 hours MDD: 2  polyethylene glycol 3350 oral powder for reconstitution: 17 gram(s) orally 2 times a day MDD: 2  Roxicodone 15 mg oral tablet: 1 tab(s) orally every 6 hours as needed for Severe Pain (7 - 10) MDD: 4  spironolactone 50 mg oral tablet: 1 tab(s) orally once a day  tiZANidine 4 mg oral tablet: 1 tab(s) orally once a day (at bedtime)  Valtrex 500 mg oral tablet: 1 tab(s) orally once a day   bisacodyl 5 mg oral delayed release tablet: 2 tab(s) orally every 12 hours as needed for Constipation MDD: 4  cyclobenzaprine 10 mg oral tablet: 1 tab(s) orally 3 times a day as needed for  muscle spasm MDD: 3  Multiple Vitamins oral tablet: 1 tab(s) orally once a day MDD: 1  naloxegol 25 mg oral tablet: 1 tab(s) orally once a day continue until regular bowel movements MDD: 1  oxyCODONE 10 mg oral tablet, extended release: 1 tab(s) orally every 12 hours MDD: 2  polyethylene glycol 3350 oral powder for reconstitution: 17 gram(s) orally 2 times a day MDD: 2  Roxicodone 15 mg oral tablet: 1 tab(s) orally every 6 hours as needed for Severe Pain (7 - 10) MDD: 4  spironolactone 50 mg oral tablet: 1 tab(s) orally once a day  tiZANidine 4 mg oral tablet: 1 tab(s) orally once a day (at bedtime)  Valtrex 500 mg oral tablet: 1 tab(s) orally once a day
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
## 2024-04-10 NOTE — DISCHARGE NOTE PROVIDER - PROVIDER TOKENS
PROVIDER:[TOKEN:[[id number]],FOLLOWUP:[2 weeks],ESTABLISHEDPATIENT:[T]],FREE:[LAST:[Radha],FIRST:[Eren],PHONE:[phone]:[(   )    -],ADDRESS:[** you must see Dr. Culver for drain removal **],FOLLOWUP:[1 week],ESTABLISHEDPATIENT:[T]],PROVIDER:[TOKEN:[[id number]],FOLLOWUP:[1 week],ESTABLISHEDPATIENT:[T]]

## 2024-04-10 NOTE — DISCHARGE NOTE PROVIDER - NSDCCAREPROVSEEN_GEN_ALL_CORE_FT
Michelle Yanes  [phone]  - spine in 2 weeks  (call earlier if you need additional medications)  Eren Garcia  [phone] - Plastics in 1 week

## 2024-04-10 NOTE — DISCHARGE NOTE PROVIDER - NSDCACTIVITY_GEN_ALL_CORE
Subjective:       Tiesha Holland is a 30 y.o. female who presents for a postpartum visit. She is 6 weeks postpartum following a . I have fully reviewed the prenatal and intrapartum course. The delivery was at 39 gestational weeks. Anesthesia: epidural. Labor and delivery was complicated by nothing.  Bleeding no bleeding. Bowel function is normal. Bladder function is normal. Postpartum depression screening: negative.     Review the Delivery Report for details.     The patient's history were reviewed and updated.    Review of Systems  Review of Systems       Objective:      /76   Ht 5' (1.524 m)   Wt 82.6 kg (182 lb 1.6 oz)   LMP 2018   Breastfeeding? No   BMI 35.56 kg/m²    General:  alert and cooperative   Abdomen: soft, non-tender; bowel sounds normal; no masses,  no organomegaly Incision- intact, healing well, no sign of infection      Vulva:  normal   Vagina: normal vagina, no discharge, exudate, lesion, or erythema   Cervix:  no lesions   Corpus: normal size, contour, position, consistency, mobility, non-tender   Adnexa:  no mass, fullness, tenderness   Rectal Exam: Not performed.          Assessment:      6 week postpartum exam.        Plan:      1. Contraception: OCP (estrogen/progesterone)  2. Follow up for annual.   
Showering allowed/Walking - Indoors allowed/No heavy lifting/straining/Walking - Outdoors allowed

## 2024-04-10 NOTE — DISCHARGE NOTE PROVIDER - CARE PROVIDER_API CALL
Michelle Yanes  Orthopaedic Surgery  [address]  [place] [address]  Phone: [phone]  Fax: [phone]  Established Patient  Follow Up Time: 2 weeks    Eren Garcia  ** you must see Dr. Culver for drain removal **  Phone: [phone]  Fax: (   )    -  Established Patient  Follow Up Time: 1 week    Eren Garcia  Plastic Surgery  [address] [place] [id number]  Phone: [phone]  Fax: [phone]  Established Patient  Follow Up Time: 1 week

## 2024-04-10 NOTE — DISCHARGE NOTE PROVIDER - NSDCCPTREATMENT_GEN_ALL_CORE_FT
PRINCIPAL PROCEDURE  Procedure: Fusion of posterior lumbar spine  Findings and Treatment:       SECONDARY PROCEDURE  Procedure: Laminectomy, spine, lumbar  Findings and Treatment:
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
## 2024-04-10 NOTE — DISCHARGE NOTE PROVIDER - NSDCCPCAREPLAN_GEN_ALL_CORE_FT
PRINCIPAL DISCHARGE DIAGNOSIS  Diagnosis: Chronic lumbar radiculopathy  Assessment and Plan of Treatment:       SECONDARY DISCHARGE DIAGNOSES  Diagnosis: Spinal instability, lumbar  Assessment and Plan of Treatment:

## 2024-04-10 NOTE — PROGRESS NOTE ADULT - PROVIDER SPECIALTY LIST ADULT
Hospitalist
Neurosurgery
Neurosurgery
Orthopedics
Plastic Surgery
Hospitalist
Neurosurgery
Neurosurgery
Orthopedics
Orthopedics
Plastic Surgery
Orthopedics
Neurosurgery
Plastic Surgery
Plastic Surgery

## 2024-04-10 NOTE — PROGRESS NOTE ADULT - REASON FOR ADMISSION
lumbar stenosis
right drop; foot condition
s/p lumbar lami fusion
Lumbar Surgery
Spine surgery
lumbar stenosis
Lumbar lami fusion
Spinal Fusion
s/p ext lumbar fusion, lumbar lami
spine surgery

## 2024-04-10 NOTE — DISCHARGE NOTE PROVIDER - NSDCFUADDINST_GEN_ALL_CORE_FT
Symptoms to Watch for  • Bleeding, drainage, redness or swelling from your incision area.  • Feelings of flu-like symptoms (e.g., nausea, general body aches, or temperature  over I00 degrees for longer than 24 hours).  • Severe headache associated with vomiting or light sensitivity.  • Any change in sensation of your arms, hands, legs or feet (e.g., increase in  numbness, tingling, or pain).  • Any loss of bladder or bowel control.  • Frequent need to urinate small amounts of urine or feeling of bladder distention.  • If you experience pain, swelling, and/or redness behind your knees or calves, go  to your nearest emergency department as these can be symptoms of a blood clot in  your legs.  • Call 911 or go to your nearest emergency room if you experience: Shortness  of breath, inability to swallow, or chest pain.  -------------------------------------------------------  Medications  • Prescribed medication(s) should be taken only as directed. Take all medication  with food in your stomach unless otherwise directed. If you experience any  abnormal reaction to the medication, discontinue the medication and contact our  office.  • As your pain lessens, you should decrease the amount of pain medication you are  taking. We suggest you substitute the narcotics with over-the-counter Tylenol as  your pain decreases.  • Do not stop taking your narcotics abruptly since this may cause withdrawal side  effects.  • Narcotic pain medications will not be refilled after normal working hours (8:30-  4:30, Monday-Thursday) or on weekends. Please allow 24 hours for refill of any  medications.  • Do not drive while taking narcotic medications.  • Do not drink alcoholic beverages while taking prescription pain medications.  • You should resume the medications you were taking prior to surgery once you get  home, unless otherwise instructed. If you have any questions regarding these  medications, please contact your physician.  • It is common to prescribe stool softeners to take after your hospital stay. It is  important that you take these as directed.  Continue your home stool softener and return to ED for evaluation if you have no BM in 2-3 days  -------------------------------------------------------  Diet  • You may resume your normal, pre-operative diet as tolerated.  • Pain medications and decreased activity can cause constipation. To prevent this,  eat foods high in fiber and drink 6-8 glasses of water per day. If needed, take an  over-the-counter stool softener if one was not prescribed for you. Your bowels  should be working normally within 3-5 days of surgery.  • Foods high in protein and vitamin C can also aid the wound healing process.  • If you had a fusion, your diet should contain 1,000-1,500 mg of calcium daily to  optimize conditions for adequate fusion. If you are not getting enough calcium in  your diet, you may take oral calcium every day. Please check with  your physician if you have a history of kidney stones.  -------------------------------------------------------  Incision Care and Dressing  • Please change your dressings with sterile gauze daily until your wound is  completely dry.  Please apply a new dressing daily to back drain site accordingly -- until you follow up with Dr. Yao  • Although you may start showering when your wound is completely dry (about 2-4  days), do not submerge in a bathtub until the wound is healed over ~ approximately  3 weeks).  • If you have Steri-Strips on your incision, allow them to fall off on their own.  • If you have Dermabond ("sterile super glue") on your incision, it will likely begin  to peel off by itself after 1-2 weeks.  • You may apply an ice pack to the surgery site for 20 minutes three times a day for  comfort and to reduce swelling.  • You may have dissolvable sutures or staples that will be addressed at your follow  up visit.  Activity  • Plan to rest for the first 24-48 hours following discharge from the hospital.  • Avoid excessive bending, twisting, pushing, pulling, or lifting more than 5  pounds.  • Walk twice a day for 30 minutes using a walker or cane as needed.  • A void sitting or standing for more than 30 minutes at a time for the first two  weeks.  • If you were fitted with a brace, please wear it for most of the day when you are  active as instructed by your physician. You may remove it at night, for showers.  -------------------------------------------------------  Reminders for Patients Who Had a Fusion  • Do NOT take anti-inflammatory medications such as aspirin, ibuprofen (Advil),  or naproxen (Aleve) as they are known to inhibit bone healing.  • Do NOT smoke or use other tobacco products for at least 3 months (ideally quit  altogether) since nicotine inhibits wound and bone healing. Use of products  containing nicotine significantly increases your risk for wound and fusion  complications.  •  you had a fusion, DO NOT take anti-inflammatory medications such as ibuprofen  or naproxen as they are known to inhibit bone healing.  Follow-up appointment  Please contact our office the first business day after discharge from the hospital to make  your first post-operative visit 1 week after surgery with the plastic surgeon.  2 weeks with the Ortho spine surgeon

## 2024-04-10 NOTE — PROGRESS NOTE ADULT - ASSESSMENT
38 year old Female POD#6 L3-4 fusion, L4-5 laminectomy, discectomy, posterior fusion with instrumentation.     PLAN:  Advance diet and activity as tolerated   L drain removed this AM- R drain still in place- will go home with R drain   Pain meds as needed   Oxycontin 10 mg BID for sustained pain control  Oxycodone 5/10/15 for breakthrough pain   IV Dilaudid d/aaron yesterday  PT OOB with Brace  Patient encouraged to be OOB/mobilizing throughout the day  Incentive spirometer encouraged  Flexeril for muscle relaxant  Will try enema for BM prior to discharge   SCDs for DVT ppx  Discharge planning today vs. tomorrow pending bowel movement     Case was discussed with Dr. Yanes

## 2024-04-10 NOTE — DISCHARGE NOTE PROVIDER - CARE PROVIDERS DIRECT ADDRESSES
[email or web address]

## 2024-04-10 NOTE — CHART NOTE - NSCHARTNOTEFT_GEN_A_CORE
Spine addendum     Pt would benefit from assistive walking device during her "at home recovery" from spine surgery.  Pt is able to ambulate well and safely while at home.   This assistive measure will allow the patient to mobilize accordingly throughout all regions of her home safely with the confidence in having such an assistive device.  Along with this longer ambulating distances both in the house and outside of the house will be achieved facilitating the fusion process.  d/w social work teams and pt accordingly  who is requesting such a device to encourage her ambulation goals while at home

## 2024-04-22 DIAGNOSIS — I95.9 HYPOTENSION, UNSPECIFIED: ICD-10-CM

## 2024-04-22 DIAGNOSIS — G43.909 MIGRAINE, UNSPECIFIED, NOT INTRACTABLE, WITHOUT STATUS MIGRAINOSUS: ICD-10-CM

## 2024-04-22 DIAGNOSIS — M48.061 SPINAL STENOSIS, LUMBAR REGION WITHOUT NEUROGENIC CLAUDICATION: ICD-10-CM

## 2024-04-22 DIAGNOSIS — M51.16 INTERVERTEBRAL DISC DISORDERS WITH RADICULOPATHY, LUMBAR REGION: ICD-10-CM

## 2024-04-22 DIAGNOSIS — K59.00 CONSTIPATION, UNSPECIFIED: ICD-10-CM

## 2024-04-22 DIAGNOSIS — Z88.8 ALLERGY STATUS TO OTHER DRUGS, MEDICAMENTS AND BIOLOGICAL SUBSTANCES: ICD-10-CM

## 2024-04-22 DIAGNOSIS — M21.371 FOOT DROP, RIGHT FOOT: ICD-10-CM

## 2024-04-22 DIAGNOSIS — F60.3 BORDERLINE PERSONALITY DISORDER: ICD-10-CM

## 2024-04-22 DIAGNOSIS — F43.10 POST-TRAUMATIC STRESS DISORDER, UNSPECIFIED: ICD-10-CM

## 2024-04-22 DIAGNOSIS — F41.9 ANXIETY DISORDER, UNSPECIFIED: ICD-10-CM

## 2024-04-22 DIAGNOSIS — N39.0 URINARY TRACT INFECTION, SITE NOT SPECIFIED: ICD-10-CM

## 2024-04-22 DIAGNOSIS — J45.909 UNSPECIFIED ASTHMA, UNCOMPLICATED: ICD-10-CM

## 2024-04-22 DIAGNOSIS — D64.9 ANEMIA, UNSPECIFIED: ICD-10-CM

## 2024-04-22 DIAGNOSIS — Z98.1 ARTHRODESIS STATUS: ICD-10-CM

## 2024-04-22 DIAGNOSIS — F17.290 NICOTINE DEPENDENCE, OTHER TOBACCO PRODUCT, UNCOMPLICATED: ICD-10-CM

## 2024-05-22 ENCOUNTER — EMERGENCY (EMERGENCY)
Facility: HOSPITAL | Age: 39
LOS: 1 days | Discharge: HOME | End: 2024-05-22
Attending: EMERGENCY MEDICINE
Payer: MEDICARE

## 2024-05-22 VITALS
TEMPERATURE: 98 F | HEART RATE: 94 BPM | WEIGHT: 160.06 LBS | SYSTOLIC BLOOD PRESSURE: 106 MMHG | HEIGHT: 66 IN | OXYGEN SATURATION: 98 % | DIASTOLIC BLOOD PRESSURE: 70 MMHG | RESPIRATION RATE: 20 BRPM

## 2024-05-22 DIAGNOSIS — Z98.1 ARTHRODESIS STATUS: Chronic | ICD-10-CM

## 2024-05-22 DIAGNOSIS — Z98.890 OTHER SPECIFIED POSTPROCEDURAL STATES: Chronic | ICD-10-CM

## 2024-05-22 LAB
ALBUMIN SERPL ELPH-MCNC: 4.8 G/DL — SIGNIFICANT CHANGE UP (ref 3.3–5.2)
ALP SERPL-CCNC: 40 U/L — SIGNIFICANT CHANGE UP (ref 40–120)
ALT FLD-CCNC: 27 U/L — SIGNIFICANT CHANGE UP
ANION GAP SERPL CALC-SCNC: 14 MMOL/L — SIGNIFICANT CHANGE UP (ref 5–17)
AST SERPL-CCNC: 32 U/L — HIGH
BASOPHILS # BLD AUTO: 0.07 K/UL — SIGNIFICANT CHANGE UP (ref 0–0.2)
BASOPHILS NFR BLD AUTO: 1.1 % — SIGNIFICANT CHANGE UP (ref 0–2)
BILIRUB SERPL-MCNC: 0.2 MG/DL — LOW (ref 0.4–2)
BUN SERPL-MCNC: 19.5 MG/DL — SIGNIFICANT CHANGE UP (ref 8–20)
CALCIUM SERPL-MCNC: 9 MG/DL — SIGNIFICANT CHANGE UP (ref 8.4–10.5)
CHLORIDE SERPL-SCNC: 103 MMOL/L — SIGNIFICANT CHANGE UP (ref 96–108)
CO2 SERPL-SCNC: 23 MMOL/L — SIGNIFICANT CHANGE UP (ref 22–29)
CREAT SERPL-MCNC: 0.66 MG/DL — SIGNIFICANT CHANGE UP (ref 0.5–1.3)
EGFR: 117 ML/MIN/1.73M2 — SIGNIFICANT CHANGE UP
EOSINOPHIL # BLD AUTO: 0.33 K/UL — SIGNIFICANT CHANGE UP (ref 0–0.5)
EOSINOPHIL NFR BLD AUTO: 5.3 % — SIGNIFICANT CHANGE UP (ref 0–6)
GLUCOSE SERPL-MCNC: 87 MG/DL — SIGNIFICANT CHANGE UP (ref 70–99)
HCG SERPL-ACNC: <4 MIU/ML — SIGNIFICANT CHANGE UP
HCT VFR BLD CALC: 33.2 % — LOW (ref 34.5–45)
HGB BLD-MCNC: 10.8 G/DL — LOW (ref 11.5–15.5)
IMM GRANULOCYTES NFR BLD AUTO: 0.2 % — SIGNIFICANT CHANGE UP (ref 0–0.9)
LIDOCAIN IGE QN: 39 U/L — SIGNIFICANT CHANGE UP (ref 22–51)
LYMPHOCYTES # BLD AUTO: 2.76 K/UL — SIGNIFICANT CHANGE UP (ref 1–3.3)
LYMPHOCYTES # BLD AUTO: 44.6 % — HIGH (ref 13–44)
MCHC RBC-ENTMCNC: 29.8 PG — SIGNIFICANT CHANGE UP (ref 27–34)
MCHC RBC-ENTMCNC: 32.5 GM/DL — SIGNIFICANT CHANGE UP (ref 32–36)
MCV RBC AUTO: 91.5 FL — SIGNIFICANT CHANGE UP (ref 80–100)
MONOCYTES # BLD AUTO: 0.51 K/UL — SIGNIFICANT CHANGE UP (ref 0–0.9)
MONOCYTES NFR BLD AUTO: 8.2 % — SIGNIFICANT CHANGE UP (ref 2–14)
NEUTROPHILS # BLD AUTO: 2.51 K/UL — SIGNIFICANT CHANGE UP (ref 1.8–7.4)
NEUTROPHILS NFR BLD AUTO: 40.6 % — LOW (ref 43–77)
PLATELET # BLD AUTO: 284 K/UL — SIGNIFICANT CHANGE UP (ref 150–400)
POTASSIUM SERPL-MCNC: 4.8 MMOL/L — SIGNIFICANT CHANGE UP (ref 3.5–5.3)
POTASSIUM SERPL-SCNC: 4.8 MMOL/L — SIGNIFICANT CHANGE UP (ref 3.5–5.3)
PROT SERPL-MCNC: 7 G/DL — SIGNIFICANT CHANGE UP (ref 6.6–8.7)
RBC # BLD: 3.63 M/UL — LOW (ref 3.8–5.2)
RBC # FLD: 12.3 % — SIGNIFICANT CHANGE UP (ref 10.3–14.5)
SODIUM SERPL-SCNC: 140 MMOL/L — SIGNIFICANT CHANGE UP (ref 135–145)
WBC # BLD: 6.19 K/UL — SIGNIFICANT CHANGE UP (ref 3.8–10.5)
WBC # FLD AUTO: 6.19 K/UL — SIGNIFICANT CHANGE UP (ref 3.8–10.5)

## 2024-05-22 PROCEDURE — 99285 EMERGENCY DEPT VISIT HI MDM: CPT

## 2024-05-22 RX ORDER — ACETAMINOPHEN 500 MG
1000 TABLET ORAL ONCE
Refills: 0 | Status: COMPLETED | OUTPATIENT
Start: 2024-05-22 | End: 2024-05-22

## 2024-05-22 RX ORDER — ONDANSETRON 8 MG/1
4 TABLET, FILM COATED ORAL ONCE
Refills: 0 | Status: COMPLETED | OUTPATIENT
Start: 2024-05-22 | End: 2024-05-22

## 2024-05-22 RX ORDER — SODIUM CHLORIDE 9 MG/ML
1000 INJECTION INTRAMUSCULAR; INTRAVENOUS; SUBCUTANEOUS ONCE
Refills: 0 | Status: COMPLETED | OUTPATIENT
Start: 2024-05-22 | End: 2024-05-22

## 2024-05-22 RX ADMIN — ONDANSETRON 4 MILLIGRAM(S): 8 TABLET, FILM COATED ORAL at 22:22

## 2024-05-22 RX ADMIN — Medication 400 MILLIGRAM(S): at 22:22

## 2024-05-22 RX ADMIN — Medication 1 MILLIGRAM(S): at 22:49

## 2024-05-22 RX ADMIN — SODIUM CHLORIDE 1000 MILLILITER(S): 9 INJECTION INTRAMUSCULAR; INTRAVENOUS; SUBCUTANEOUS at 22:22

## 2024-05-22 NOTE — ED ADULT TRIAGE NOTE - CHIEF COMPLAINT QUOTE
Pt  arrives to ED c/o lower  abd pain - was diagnosed with ovarian cyst- was told by OBGYN she needs " MRII"

## 2024-05-22 NOTE — ED PROVIDER NOTE - NSFOLLOWUPINSTRUCTIONS_ED_ALL_ED_FT
- Follow up with GYN    Abdominal Pain    Many things can cause abdominal pain. Many times, abdominal pain is not caused by a disease and will improve without treatment. Your health care provider will do a physical exam to determine if there is a dangerous cause of your pain; blood tests and imaging may help determine the cause of your pain. However, in many cases, no cause may be found and you may need further testing as an outpatient. Monitor your abdominal pain for any changes.     SEEK IMMEDIATE MEDICAL CARE IF YOU HAVE ANY OF THE FOLLOWING SYMPTOMS: worsening abdominal pain, uncontrollable vomiting, profuse diarrhea, inability to have bowel movements or pass gas, black or bloody stools, fever accompanying chest pain or back pain, or fainting. These symptoms may represent a serious problem that is an emergency. Do not wait to see if the symptoms will go away. Get medical help right away. Call 911 and do not drive yourself to the hospital.

## 2024-05-22 NOTE — ED PROVIDER NOTE - PATIENT PORTAL LINK FT
[de-identified] : 6/23/23: right breast tissue rearrangement and possible left breast reduction\par 
You can access the FollowMyHealth Patient Portal offered by [facility] by registering at the following website: [email or web address]. By joining [employer]’s FollowMyHealth portal, you will also be able to view your health information using other applications (apps) compatible with our system.
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
## 2024-05-22 NOTE — ED PROVIDER NOTE - PHYSICAL EXAMINATION
Gen: awake, tremors, anxious. non toxic.   HEENT: Mucous membranes moist, pink conjunctivae, EOMI  CV: RRR, nl s1/s2.  Resp: CTAB, normal rate and effort  GI: brace around back/abd. reports ttp, distractible, no rebound/guarding.   : No CVAT  Neuro: A&O x 3, moving all 4 extremities  MSK: No spine or joint tenderness to palpation  Skin: No rashes. intact and perfused.

## 2024-05-22 NOTE — ED ADULT TRIAGE NOTE - DIRECT TO ROOM CARE INITIATED:
----- Message from Aarti Stuart sent at 1/17/2024 12:21 PM EST -----  Subject: Refill Request    QUESTIONS  Name of Medication? Tirzepatide (MOUNJARO) 5 MG/0.5ML SOPN SC injection  Patient-reported dosage and instructions? Inject 0.5 mLs into the skin   once a week  How many days do you have left? 0  Preferred Pharmacy? [id number]  Pharmacy phone number (if available)? [phone]  ---------------------------------------------------------------------------  --------------  CALL BACK INFO  What is the best way for the office to contact you? OK to leave message on   voicemail  Preferred Call Back Phone Number? [phone]  ---------------------------------------------------------------------------  --------------  SCRIPT ANSWERS  Relationship to Patient? Self  
Already called in  
22-May-2024 20:50
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
## 2024-05-23 VITALS
RESPIRATION RATE: 19 BRPM | HEART RATE: 62 BPM | TEMPERATURE: 98 F | SYSTOLIC BLOOD PRESSURE: 108 MMHG | DIASTOLIC BLOOD PRESSURE: 63 MMHG | OXYGEN SATURATION: 98 %

## 2024-05-23 PROBLEM — M21.371 FOOT DROP, RIGHT FOOT: Chronic | Status: ACTIVE | Noted: 2024-03-28

## 2024-05-23 PROBLEM — Z87.42 PERSONAL HISTORY OF OTHER DISEASES OF THE FEMALE GENITAL TRACT: Chronic | Status: ACTIVE | Noted: 2024-03-28

## 2024-05-23 PROBLEM — G43.909 MIGRAINE, UNSPECIFIED, NOT INTRACTABLE, WITHOUT STATUS MIGRAINOSUS: Chronic | Status: ACTIVE | Noted: 2024-03-28

## 2024-05-23 PROBLEM — J45.909 UNSPECIFIED ASTHMA, UNCOMPLICATED: Chronic | Status: ACTIVE | Noted: 2024-03-28

## 2024-05-23 PROBLEM — F41.9 ANXIETY DISORDER, UNSPECIFIED: Chronic | Status: ACTIVE | Noted: 2024-03-28

## 2024-05-23 PROBLEM — F12.90 CANNABIS USE, UNSPECIFIED, UNCOMPLICATED: Chronic | Status: ACTIVE | Noted: 2024-03-28

## 2024-05-23 PROBLEM — B00.9 HERPESVIRAL INFECTION, UNSPECIFIED: Chronic | Status: ACTIVE | Noted: 2024-03-28

## 2024-05-23 PROBLEM — F43.10 POST-TRAUMATIC STRESS DISORDER, UNSPECIFIED: Chronic | Status: ACTIVE | Noted: 2024-03-28

## 2024-05-23 PROBLEM — M48.062 SPINAL STENOSIS, LUMBAR REGION WITH NEUROGENIC CLAUDICATION: Chronic | Status: ACTIVE | Noted: 2024-03-28

## 2024-05-23 PROBLEM — B82.0: Chronic | Status: ACTIVE | Noted: 2024-03-28

## 2024-05-23 PROBLEM — N39.0 URINARY TRACT INFECTION, SITE NOT SPECIFIED: Chronic | Status: ACTIVE | Noted: 2024-04-03

## 2024-05-23 PROBLEM — M54.16 RADICULOPATHY, LUMBAR REGION: Chronic | Status: ACTIVE | Noted: 2024-03-28

## 2024-05-23 PROBLEM — Z72.0 TOBACCO USE: Chronic | Status: ACTIVE | Noted: 2024-03-28

## 2024-05-23 PROBLEM — R11.15 CYCLICAL VOMITING SYNDROME UNRELATED TO MIGRAINE: Chronic | Status: ACTIVE | Noted: 2024-03-28

## 2024-05-23 PROBLEM — M51.26 OTHER INTERVERTEBRAL DISC DISPLACEMENT, LUMBAR REGION: Chronic | Status: ACTIVE | Noted: 2024-03-28

## 2024-05-23 LAB
APPEARANCE UR: CLEAR — SIGNIFICANT CHANGE UP
BACTERIA # UR AUTO: NEGATIVE /HPF — SIGNIFICANT CHANGE UP
BILIRUB UR-MCNC: NEGATIVE — SIGNIFICANT CHANGE UP
COLOR SPEC: YELLOW — SIGNIFICANT CHANGE UP
DIFF PNL FLD: ABNORMAL
GLUCOSE UR QL: NEGATIVE MG/DL — SIGNIFICANT CHANGE UP
KETONES UR-MCNC: NEGATIVE MG/DL — SIGNIFICANT CHANGE UP
LEUKOCYTE ESTERASE UR-ACNC: NEGATIVE — SIGNIFICANT CHANGE UP
NITRITE UR-MCNC: NEGATIVE — SIGNIFICANT CHANGE UP
PH UR: 6 — SIGNIFICANT CHANGE UP (ref 5–8)
PROT UR-MCNC: NEGATIVE MG/DL — SIGNIFICANT CHANGE UP
RBC CASTS # UR COMP ASSIST: 3 /HPF — SIGNIFICANT CHANGE UP (ref 0–4)
SP GR SPEC: 1.01 — SIGNIFICANT CHANGE UP (ref 1–1.03)
SQUAMOUS # UR AUTO: 7 /HPF — HIGH (ref 0–5)
UROBILINOGEN FLD QL: 0.2 MG/DL — SIGNIFICANT CHANGE UP (ref 0.2–1)
WBC UR QL: 0 /HPF — SIGNIFICANT CHANGE UP (ref 0–5)

## 2024-05-23 PROCEDURE — 76830 TRANSVAGINAL US NON-OB: CPT

## 2024-05-23 PROCEDURE — 76830 TRANSVAGINAL US NON-OB: CPT | Mod: 26

## 2024-05-23 PROCEDURE — 85025 COMPLETE CBC W/AUTO DIFF WBC: CPT

## 2024-05-23 PROCEDURE — 81001 URINALYSIS AUTO W/SCOPE: CPT

## 2024-05-23 PROCEDURE — 76856 US EXAM PELVIC COMPLETE: CPT

## 2024-05-23 PROCEDURE — 96374 THER/PROPH/DIAG INJ IV PUSH: CPT

## 2024-05-23 PROCEDURE — 83690 ASSAY OF LIPASE: CPT

## 2024-05-23 PROCEDURE — 76856 US EXAM PELVIC COMPLETE: CPT | Mod: 26

## 2024-05-23 PROCEDURE — 36415 COLL VENOUS BLD VENIPUNCTURE: CPT

## 2024-05-23 PROCEDURE — 87086 URINE CULTURE/COLONY COUNT: CPT

## 2024-05-23 PROCEDURE — 99284 EMERGENCY DEPT VISIT MOD MDM: CPT | Mod: 25

## 2024-05-23 PROCEDURE — 80053 COMPREHEN METABOLIC PANEL: CPT

## 2024-05-23 PROCEDURE — 96375 TX/PRO/DX INJ NEW DRUG ADDON: CPT

## 2024-05-23 PROCEDURE — 99284 EMERGENCY DEPT VISIT MOD MDM: CPT | Mod: GC

## 2024-05-23 PROCEDURE — 84702 CHORIONIC GONADOTROPIN TEST: CPT

## 2024-05-23 RX ORDER — OXYCODONE AND ACETAMINOPHEN 5; 325 MG/1; MG/1
1 TABLET ORAL
Qty: 9 | Refills: 0
Start: 2024-05-23 | End: 2024-05-25

## 2024-05-23 RX ORDER — MORPHINE SULFATE 50 MG/1
4 CAPSULE, EXTENDED RELEASE ORAL ONCE
Refills: 0 | Status: DISCONTINUED | OUTPATIENT
Start: 2024-05-23 | End: 2024-05-23

## 2024-05-23 RX ORDER — MORPHINE SULFATE 50 MG/1
2 CAPSULE, EXTENDED RELEASE ORAL ONCE
Refills: 0 | Status: DISCONTINUED | OUTPATIENT
Start: 2024-05-23 | End: 2024-05-23

## 2024-05-23 RX ORDER — IBUPROFEN 200 MG
1 TABLET ORAL
Qty: 28 | Refills: 0
Start: 2024-05-23 | End: 2024-05-29

## 2024-05-23 RX ADMIN — MORPHINE SULFATE 4 MILLIGRAM(S): 50 CAPSULE, EXTENDED RELEASE ORAL at 01:44

## 2024-05-23 RX ADMIN — MORPHINE SULFATE 2 MILLIGRAM(S): 50 CAPSULE, EXTENDED RELEASE ORAL at 01:41

## 2024-05-23 RX ADMIN — MORPHINE SULFATE 2 MILLIGRAM(S): 50 CAPSULE, EXTENDED RELEASE ORAL at 01:46

## 2024-05-23 NOTE — ED ADULT NURSE NOTE - NSFALLUNIVINTERV_ED_ALL_ED
Bed/Stretcher in lowest position, wheels locked, appropriate side rails in place/Call bell, personal items and telephone in reach/Instruct patient to call for assistance before getting out of bed/chair/stretcher/Non-slip footwear applied when patient is off stretcher/[place] to call system/Physically safe environment - no spills, clutter or unnecessary equipment/Purposeful proactive rounding/Room/bathroom lighting operational, light cord in reach

## 2024-05-23 NOTE — CONSULT NOTE ADULT - SUBJECTIVE AND OBJECTIVE BOX
37 yo G0 LMP 24 presents to [facility] ED due to multiple pain complaints. Patient reports severe discomfort in her pelvic area and pain associated with any increased abdominal pressure. She has pain when voiding or having bowel movements. She reports these symptoms started 3 weeks ago and have been associated with nausea and vomiting. She reports a history of endometriosis for which she has underwent 6 laparoscopic surgeries for excision, she endorses a history of chocolate cysts, she endorses a history of pain syndromes that are being investigated by multiple disciplines including GI, neurology, gynecology.    She most recently saw Dr. Mcclain, her previous Gynecologist retired    POBHx: G0  PGynHx: endometriosis  PMHx: chronic back pain, unspecified neurologic syndrome, endometriosis, anxiety, PTSH, right foot drop  PSHx: laparoscopic surgeries for endometriosis, vertebral disc fusion surgery x2  Meds: miralax, tizanidine, cyclobenzprine, roxicodone,   All: gabapentin, reglan, compazine    OBJECTIVE:  Vital Signs Last 24 Hrs  T(C): 36.4 (23 May 2024 05:57), Max: 37.1 (23 May 2024 01:17)  T(F): 97.5 (23 May 2024 05:57), Max: 98.8 (23 May 2024 01:17)  HR: 62 (23 May 2024 05:57) (62 - 94)  BP: 108/63 (23 May 2024 05:57) (103/62 - 108/63)    Physical Exam:  Gen: NAD, well-appearing, AAOx3   Abd: Soft, moderately TTP in RLQ, no rebound tenderness or gaurding  Ext: non-tender, non-edematous  SVE: deferred         LABS:                          10.8   6.19  )-----------( 284      ( 22 May 2024 22:30 )             33.2     05-    140  |  103  |  19.5  ----------------------------<  87  4.8   |  23.0  |  0.66    Ca    9.0      22 May 2024 22:30    TPro  7.0  /  Alb  4.8  /  TBili  0.2<L>  /  DBili  x   /  AST  32<H>  /  ALT  27  /  AlkPhos  40  05-22    LIVER FUNCTIONS - ( 22 May 2024 22:30 )  Alb: 4.8 g/dL / Pro: 7.0 g/dL / ALK PHOS: 40 U/L / ALT: 27 U/L / AST: 32 U/L / GGT: x             Urinalysis Basic - ( 23 May 2024 04:15 )    Color: Yellow / Appearance: Clear / S.011 / pH: x  Gluc: x / Ketone: Negative mg/dL  / Bili: Negative / Urobili: 0.2 mg/dL   Blood: x / Protein: Negative mg/dL / Nitrite: Negative   Leuk Esterase: Negative / RBC: 3 /HPF / WBC 0 /HPF   Sq Epi: x / Non Sq Epi: 7 /HPF / Bacteria: Negative /HPF    PROCEDURE DATE:  2024          INTERPRETATION:  CLINICAL INFORMATION: Pelvic pain. History of right   adnexal hemorrhagic cyst.    LMP: 2024    COMPARISON: 2/15/2009    TECHNIQUE:  Endovaginal and transabdominal pelvic sonogram. Color and Spectral   Doppler was performed.    FINDINGS:  Uterus: 6.7 x 3.3 x 3.9 cm. Within normal limits.  Endometrium: 6 mm. Within normal limits.    Right ovary: 3.6 x 2.6 x 3.2 cm. Complex cystic structure with internal   echoes measures 2.6 x 1.8 x 1.7 cm. Normal arterial and venous waveforms.    Left ovary: 3.2 x 1.0 x 2.2 cm. Within normal limits. Normal arterial and   venous waveforms.    Fluid: Trace free fluid in the cul-de-sac.    IMPRESSION:  2.6 cm complex right ovarian cyst likely reflecting hemorrhagic cyst.   Flow identified within the ovary at time of imaging. Intermittent torsion   should be excluded clinically.

## 2024-05-23 NOTE — ED ADULT NURSE NOTE - OBJECTIVE STATEMENT
Pt A&Ox4, rr even and unlabored. Pt has pmhx of anxiety, PTSD, endometriosis and cyclic vomiting, Pt has lumbar spine surgery last month and has been having abdominal pain for months. Pt had CT done at [place] which showed a ovarian cyst, went to OB/GYN today and was given script for further testing. Pt experiencing tremors, vomiting and abdominal pain.

## 2024-05-23 NOTE — CONSULT NOTE ADULT - ASSESSMENT
37 yo G0 LMP 5/18/24 presents to [facility] ED due to multiple pain complaints, gyn consulted to r/o intermittent ovarian torsion. Given history of chronic pain symptoms and abdominal exam absent of alarm symptoms, intermittent ovarian torsion unlikely at this time.     #R/o ovarian torsion  - B/l venous/arterial flow evidenced on doppler  - 2.6cm cyst unlikely to be cause for torsion  - Low suspicion for torsion, no acute gyn intervention  - Described to patient that hemorrhagic cysts can occasionally cause abdominal pain due to free fluid irritation of pelvis, but it is unlikely to be the source of the patient's other symptomatology   - Patient recommended to f/u with Dr. Leggett for her endometriosis management and other gyn management    Dispo: per ED, gyn signing off  Discussed with Dr. Dangelo

## 2024-05-23 NOTE — CONSULT NOTE ADULT - ATTENDING COMMENTS
37 yo G0 LMP 5/18/24 presents to [facility] ED due to discomfort in her pelvic area and pain associated with any increased abdominal pressure. She has a history of endometriosis currently not on suppression but plans surgery with Dr. Zacarias. She has a small ovarian cyst that demonstrates flow and does not have a clinical picture c/w torsion or PID. Suggest checking HCG. 37 yo G0 LMP 5/18/24 presents to [facility] ED due to discomfort in her pelvic area and pain associated with any increased abdominal pressure. She has a history of endometriosis currently not on suppression but plans surgery with Dr. Zacarias. She has a small ovarian cyst that demonstrates flow and does not have a clinical picture c/w torsion or PID. Suggest checking UCG. 37 yo G0 LMP 5/18/24 presents to [facility] ED due to discomfort in her pelvic area and pain associated with any increased abdominal pressure. She has a history of endometriosis currently not on suppression but plans surgery with Dr. Zacarias. She has a small ovarian cyst that demonstrates flow and does not have a clinical picture c/w torsion or PID.

## 2024-05-24 LAB
CULTURE RESULTS: SIGNIFICANT CHANGE UP
SPECIMEN SOURCE: SIGNIFICANT CHANGE UP

## 2024-08-19 ENCOUNTER — NON-APPOINTMENT (OUTPATIENT)
Age: 39
End: 2024-08-19

## 2024-09-05 ENCOUNTER — APPOINTMENT (OUTPATIENT)
Dept: OBGYN | Facility: CLINIC | Age: 39
End: 2024-09-05

## 2024-11-04 ENCOUNTER — NON-APPOINTMENT (OUTPATIENT)
Age: 39
End: 2024-11-04

## 2024-11-16 ENCOUNTER — EMERGENCY (EMERGENCY)
Facility: HOSPITAL | Age: 39
LOS: 1 days | Discharge: HOME | End: 2024-11-16
Attending: STUDENT IN AN ORGANIZED HEALTH CARE EDUCATION/TRAINING PROGRAM
Payer: MEDICARE

## 2024-11-16 VITALS
WEIGHT: 119.93 LBS | SYSTOLIC BLOOD PRESSURE: 127 MMHG | OXYGEN SATURATION: 98 % | HEIGHT: 62 IN | HEART RATE: 127 BPM | DIASTOLIC BLOOD PRESSURE: 89 MMHG | RESPIRATION RATE: 20 BRPM | TEMPERATURE: 98 F

## 2024-11-16 DIAGNOSIS — F29 UNSPECIFIED PSYCHOSIS NOT DUE TO A SUBSTANCE OR KNOWN PHYSIOLOGICAL CONDITION: ICD-10-CM

## 2024-11-16 DIAGNOSIS — Z98.890 OTHER SPECIFIED POSTPROCEDURAL STATES: Chronic | ICD-10-CM

## 2024-11-16 DIAGNOSIS — Z98.1 ARTHRODESIS STATUS: Chronic | ICD-10-CM

## 2024-11-16 LAB
ALBUMIN SERPL ELPH-MCNC: 4.6 G/DL — SIGNIFICANT CHANGE UP (ref 3.3–5.2)
ALP SERPL-CCNC: 49 U/L — SIGNIFICANT CHANGE UP (ref 40–120)
ALT FLD-CCNC: 39 U/L — HIGH
ANION GAP SERPL CALC-SCNC: 16 MMOL/L — SIGNIFICANT CHANGE UP (ref 5–17)
APAP SERPL-MCNC: <3 UG/ML — LOW (ref 10–26)
AST SERPL-CCNC: 42 U/L — HIGH
BASOPHILS # BLD AUTO: 0.07 K/UL — SIGNIFICANT CHANGE UP (ref 0–0.2)
BASOPHILS NFR BLD AUTO: 0.5 % — SIGNIFICANT CHANGE UP (ref 0–2)
BILIRUB SERPL-MCNC: 0.3 MG/DL — LOW (ref 0.4–2)
BUN SERPL-MCNC: 17.5 MG/DL — SIGNIFICANT CHANGE UP (ref 8–20)
CALCIUM SERPL-MCNC: 9.4 MG/DL — SIGNIFICANT CHANGE UP (ref 8.4–10.5)
CHLORIDE SERPL-SCNC: 100 MMOL/L — SIGNIFICANT CHANGE UP (ref 96–108)
CO2 SERPL-SCNC: 24 MMOL/L — SIGNIFICANT CHANGE UP (ref 22–29)
CREAT SERPL-MCNC: 0.75 MG/DL — SIGNIFICANT CHANGE UP (ref 0.5–1.3)
EGFR: 105 ML/MIN/1.73M2 — SIGNIFICANT CHANGE UP
EGFR: 105 ML/MIN/1.73M2 — SIGNIFICANT CHANGE UP
EOSINOPHIL # BLD AUTO: 0.08 K/UL — SIGNIFICANT CHANGE UP (ref 0–0.5)
EOSINOPHIL NFR BLD AUTO: 0.5 % — SIGNIFICANT CHANGE UP (ref 0–6)
ETHANOL SERPL-MCNC: <10 MG/DL — SIGNIFICANT CHANGE UP (ref 0–9)
GLUCOSE SERPL-MCNC: 98 MG/DL — SIGNIFICANT CHANGE UP (ref 70–99)
HCG SERPL-ACNC: <4 MIU/ML — SIGNIFICANT CHANGE UP
HCT VFR BLD CALC: 36.1 % — SIGNIFICANT CHANGE UP (ref 34.5–45)
HGB BLD-MCNC: 12 G/DL — SIGNIFICANT CHANGE UP (ref 11.5–15.5)
IMM GRANULOCYTES NFR BLD AUTO: 0.3 % — SIGNIFICANT CHANGE UP (ref 0–0.9)
LYMPHOCYTES # BLD AUTO: 1.55 K/UL — SIGNIFICANT CHANGE UP (ref 1–3.3)
LYMPHOCYTES # BLD AUTO: 10.3 % — LOW (ref 13–44)
MCHC RBC-ENTMCNC: 29.3 PG — SIGNIFICANT CHANGE UP (ref 27–34)
MCHC RBC-ENTMCNC: 33.2 G/DL — SIGNIFICANT CHANGE UP (ref 32–36)
MCV RBC AUTO: 88 FL — SIGNIFICANT CHANGE UP (ref 80–100)
MONOCYTES # BLD AUTO: 0.79 K/UL — SIGNIFICANT CHANGE UP (ref 0–0.9)
MONOCYTES NFR BLD AUTO: 5.3 % — SIGNIFICANT CHANGE UP (ref 2–14)
NEUTROPHILS # BLD AUTO: 12.5 K/UL — HIGH (ref 1.8–7.4)
NEUTROPHILS NFR BLD AUTO: 83.1 % — HIGH (ref 43–77)
PLATELET # BLD AUTO: 398 K/UL — SIGNIFICANT CHANGE UP (ref 150–400)
POTASSIUM SERPL-MCNC: 4.1 MMOL/L — SIGNIFICANT CHANGE UP (ref 3.5–5.3)
POTASSIUM SERPL-SCNC: 4.1 MMOL/L — SIGNIFICANT CHANGE UP (ref 3.5–5.3)
PROT SERPL-MCNC: 7.1 G/DL — SIGNIFICANT CHANGE UP (ref 6.6–8.7)
RBC # BLD: 4.1 M/UL — SIGNIFICANT CHANGE UP (ref 3.8–5.2)
RBC # FLD: 13.3 % — SIGNIFICANT CHANGE UP (ref 10.3–14.5)
SALICYLATES SERPL-MCNC: <0.6 MG/DL — LOW (ref 10–20)
SARS-COV-2 RNA SPEC QL NAA+PROBE: SIGNIFICANT CHANGE UP
SODIUM SERPL-SCNC: 140 MMOL/L — SIGNIFICANT CHANGE UP (ref 135–145)
WBC # BLD: 15.03 K/UL — HIGH (ref 3.8–10.5)
WBC # FLD AUTO: 15.03 K/UL — HIGH (ref 3.8–10.5)

## 2024-11-16 PROCEDURE — 99223 1ST HOSP IP/OBS HIGH 75: CPT

## 2024-11-16 PROCEDURE — 93010 ELECTROCARDIOGRAM REPORT: CPT

## 2024-11-16 RX ORDER — HALOPERIDOL 10 MG/1
5 TABLET ORAL EVERY 6 HOURS
Refills: 0 | Status: DISCONTINUED | OUTPATIENT
Start: 2024-11-16 | End: 2024-11-24

## 2024-11-16 RX ORDER — DIPHENHYDRAMINE HCL 12.5MG/5ML
50 ELIXIR ORAL EVERY 6 HOURS
Refills: 0 | Status: DISCONTINUED | OUTPATIENT
Start: 2024-11-16 | End: 2024-11-24

## 2024-11-16 RX ORDER — DIAZEPAM 5 MG/1
10 TABLET ORAL THREE TIMES A DAY
Refills: 0 | Status: COMPLETED | OUTPATIENT
Start: 2024-11-16 | End: 2024-11-23

## 2024-11-16 RX ORDER — LORAZEPAM 4 MG/ML
2 VIAL (ML) INJECTION ONCE
Refills: 0 | Status: DISCONTINUED | OUTPATIENT
Start: 2024-11-16 | End: 2024-11-16

## 2024-11-16 RX ORDER — DIPHENHYDRAMINE HCL 12.5MG/5ML
50 ELIXIR ORAL ONCE
Refills: 0 | Status: DISCONTINUED | OUTPATIENT
Start: 2024-11-16 | End: 2024-11-16

## 2024-11-16 RX ORDER — HALOPERIDOL 10 MG/1
5 TABLET ORAL ONCE
Refills: 0 | Status: DISCONTINUED | OUTPATIENT
Start: 2024-11-16 | End: 2024-11-16

## 2024-11-16 RX ORDER — LORAZEPAM 4 MG/ML
2 VIAL (ML) INJECTION EVERY 6 HOURS
Refills: 0 | Status: DISCONTINUED | OUTPATIENT
Start: 2024-11-16 | End: 2024-11-16

## 2024-11-16 RX ADMIN — Medication 50 MILLIGRAM(S): at 18:30

## 2024-11-16 RX ADMIN — DIAZEPAM 10 MILLIGRAM(S): 5 TABLET ORAL at 14:52

## 2024-11-16 RX ADMIN — HALOPERIDOL 5 MILLIGRAM(S): 10 TABLET ORAL at 18:30

## 2024-11-16 RX ADMIN — Medication 2 MILLIGRAM(S): at 17:45

## 2024-11-17 LAB
ALBUMIN SERPL ELPH-MCNC: 4.5 G/DL — SIGNIFICANT CHANGE UP (ref 3.3–5.2)
ALP SERPL-CCNC: 52 U/L — SIGNIFICANT CHANGE UP (ref 40–120)
ALT FLD-CCNC: 37 U/L — HIGH
AMPHET UR-MCNC: POSITIVE
ANION GAP SERPL CALC-SCNC: 12 MMOL/L — SIGNIFICANT CHANGE UP (ref 5–17)
APPEARANCE UR: ABNORMAL
APPEARANCE UR: ABNORMAL
AST SERPL-CCNC: 59 U/L — HIGH
BACTERIA # UR AUTO: ABNORMAL /HPF
BARBITURATES UR SCN-MCNC: NEGATIVE — SIGNIFICANT CHANGE UP
BASOPHILS # BLD AUTO: 0.03 K/UL — SIGNIFICANT CHANGE UP (ref 0–0.2)
BASOPHILS NFR BLD AUTO: 0.6 % — SIGNIFICANT CHANGE UP (ref 0–2)
BENZODIAZ UR-MCNC: POSITIVE
BILIRUB SERPL-MCNC: 0.3 MG/DL — LOW (ref 0.4–2)
BILIRUB UR-MCNC: NEGATIVE — SIGNIFICANT CHANGE UP
BILIRUB UR-MCNC: NEGATIVE — SIGNIFICANT CHANGE UP
BUN SERPL-MCNC: 20.5 MG/DL — HIGH (ref 8–20)
CALCIUM SERPL-MCNC: 9 MG/DL — SIGNIFICANT CHANGE UP (ref 8.4–10.5)
CAST: 1 /LPF — SIGNIFICANT CHANGE UP (ref 0–4)
CHLORIDE SERPL-SCNC: 101 MMOL/L — SIGNIFICANT CHANGE UP (ref 96–108)
CO2 SERPL-SCNC: 27 MMOL/L — SIGNIFICANT CHANGE UP (ref 22–29)
COCAINE METAB.OTHER UR-MCNC: NEGATIVE — SIGNIFICANT CHANGE UP
COLOR SPEC: SIGNIFICANT CHANGE UP
COLOR SPEC: YELLOW — SIGNIFICANT CHANGE UP
CREAT SERPL-MCNC: 0.64 MG/DL — SIGNIFICANT CHANGE UP (ref 0.5–1.3)
DIFF PNL FLD: ABNORMAL
DIFF PNL FLD: ABNORMAL
EGFR: 117 ML/MIN/1.73M2 — SIGNIFICANT CHANGE UP
EGFR: 117 ML/MIN/1.73M2 — SIGNIFICANT CHANGE UP
EOSINOPHIL # BLD AUTO: 0.07 K/UL — SIGNIFICANT CHANGE UP (ref 0–0.5)
EOSINOPHIL NFR BLD AUTO: 1.4 % — SIGNIFICANT CHANGE UP (ref 0–6)
FENTANYL UR QL SCN: NEGATIVE — SIGNIFICANT CHANGE UP
FLUAV AG NPH QL: SIGNIFICANT CHANGE UP
FLUBV AG NPH QL: SIGNIFICANT CHANGE UP
GLUCOSE SERPL-MCNC: 119 MG/DL — HIGH (ref 70–99)
GLUCOSE UR QL: NEGATIVE MG/DL — SIGNIFICANT CHANGE UP
GLUCOSE UR QL: NEGATIVE MG/DL — SIGNIFICANT CHANGE UP
HCT VFR BLD CALC: 37.4 % — SIGNIFICANT CHANGE UP (ref 34.5–45)
HGB BLD-MCNC: 12.4 G/DL — SIGNIFICANT CHANGE UP (ref 11.5–15.5)
IMM GRANULOCYTES NFR BLD AUTO: 0.2 % — SIGNIFICANT CHANGE UP (ref 0–0.9)
KETONES UR-MCNC: ABNORMAL MG/DL
KETONES UR-MCNC: NEGATIVE MG/DL — SIGNIFICANT CHANGE UP
LEUKOCYTE ESTERASE UR-ACNC: ABNORMAL
LEUKOCYTE ESTERASE UR-ACNC: ABNORMAL
LYMPHOCYTES # BLD AUTO: 1.06 K/UL — SIGNIFICANT CHANGE UP (ref 1–3.3)
LYMPHOCYTES # BLD AUTO: 21.9 % — SIGNIFICANT CHANGE UP (ref 13–44)
MCHC RBC-ENTMCNC: 29.7 PG — SIGNIFICANT CHANGE UP (ref 27–34)
MCHC RBC-ENTMCNC: 33.2 G/DL — SIGNIFICANT CHANGE UP (ref 32–36)
MCV RBC AUTO: 89.7 FL — SIGNIFICANT CHANGE UP (ref 80–100)
METHADONE UR-MCNC: NEGATIVE — SIGNIFICANT CHANGE UP
MONOCYTES # BLD AUTO: 0.34 K/UL — SIGNIFICANT CHANGE UP (ref 0–0.9)
MONOCYTES NFR BLD AUTO: 7 % — SIGNIFICANT CHANGE UP (ref 2–14)
NEUTROPHILS # BLD AUTO: 3.34 K/UL — SIGNIFICANT CHANGE UP (ref 1.8–7.4)
NEUTROPHILS NFR BLD AUTO: 68.9 % — SIGNIFICANT CHANGE UP (ref 43–77)
NITRITE UR-MCNC: NEGATIVE — SIGNIFICANT CHANGE UP
NITRITE UR-MCNC: NEGATIVE — SIGNIFICANT CHANGE UP
OPIATES UR-MCNC: NEGATIVE — SIGNIFICANT CHANGE UP
PCP SPEC-MCNC: SIGNIFICANT CHANGE UP
PCP UR-MCNC: NEGATIVE — SIGNIFICANT CHANGE UP
PH UR: 6 — SIGNIFICANT CHANGE UP (ref 5–8)
PH UR: 6 — SIGNIFICANT CHANGE UP (ref 5–8)
PLATELET # BLD AUTO: 352 K/UL — SIGNIFICANT CHANGE UP (ref 150–400)
POTASSIUM SERPL-MCNC: 4.6 MMOL/L — SIGNIFICANT CHANGE UP (ref 3.5–5.3)
POTASSIUM SERPL-SCNC: 4.6 MMOL/L — SIGNIFICANT CHANGE UP (ref 3.5–5.3)
PROT SERPL-MCNC: 7.1 G/DL — SIGNIFICANT CHANGE UP (ref 6.6–8.7)
PROT UR-MCNC: 30 MG/DL
PROT UR-MCNC: SIGNIFICANT CHANGE UP MG/DL
RBC # BLD: 4.17 M/UL — SIGNIFICANT CHANGE UP (ref 3.8–5.2)
RBC # FLD: 13.3 % — SIGNIFICANT CHANGE UP (ref 10.3–14.5)
RBC CASTS # UR COMP ASSIST: 3 /HPF — SIGNIFICANT CHANGE UP (ref 0–4)
RSV RNA NPH QL NAA+NON-PROBE: SIGNIFICANT CHANGE UP
SARS-COV-2 RNA SPEC QL NAA+PROBE: SIGNIFICANT CHANGE UP
SODIUM SERPL-SCNC: 140 MMOL/L — SIGNIFICANT CHANGE UP (ref 135–145)
SP GR SPEC: 1.02 — SIGNIFICANT CHANGE UP (ref 1–1.03)
SP GR SPEC: 1.03 — SIGNIFICANT CHANGE UP (ref 1–1.03)
SQUAMOUS # UR AUTO: >36 /HPF — HIGH (ref 0–5)
THC UR QL: POSITIVE
UROBILINOGEN FLD QL: 0.2 MG/DL — SIGNIFICANT CHANGE UP (ref 0.2–1)
UROBILINOGEN FLD QL: 1 MG/DL — SIGNIFICANT CHANGE UP (ref 0.2–1)
WBC # BLD: 4.85 K/UL — SIGNIFICANT CHANGE UP (ref 3.8–10.5)
WBC # FLD AUTO: 4.85 K/UL — SIGNIFICANT CHANGE UP (ref 3.8–10.5)
WBC UR QL: 20 /HPF — HIGH (ref 0–5)

## 2024-11-17 PROCEDURE — 71046 X-RAY EXAM CHEST 2 VIEWS: CPT | Mod: 26

## 2024-11-17 PROCEDURE — 99232 SBSQ HOSP IP/OBS MODERATE 35: CPT

## 2024-11-17 RX ORDER — IBUPROFEN 200 MG
800 TABLET ORAL THREE TIMES A DAY
Refills: 0 | Status: DISCONTINUED | OUTPATIENT
Start: 2024-11-17 | End: 2024-11-24

## 2024-11-17 RX ORDER — OLANZAPINE 10 MG/1
5 TABLET ORAL AT BEDTIME
Refills: 0 | Status: DISCONTINUED | OUTPATIENT
Start: 2024-11-17 | End: 2024-11-24

## 2024-11-17 RX ORDER — MELATONIN 5 MG
10 TABLET ORAL AT BEDTIME
Refills: 0 | Status: DISCONTINUED | OUTPATIENT
Start: 2024-11-17 | End: 2024-11-24

## 2024-11-17 RX ORDER — BACLOFEN 10 MG/20ML
10 INJECTION INTRATHECAL EVERY 12 HOURS
Refills: 0 | Status: DISCONTINUED | OUTPATIENT
Start: 2024-11-17 | End: 2024-11-24

## 2024-11-17 RX ADMIN — OLANZAPINE 5 MILLIGRAM(S): 10 TABLET ORAL at 21:41

## 2024-11-17 RX ADMIN — Medication 10 MILLIGRAM(S): at 21:41

## 2024-11-17 RX ADMIN — DIAZEPAM 10 MILLIGRAM(S): 5 TABLET ORAL at 16:26

## 2024-11-17 RX ADMIN — Medication 800 MILLIGRAM(S): at 18:54

## 2024-11-17 RX ADMIN — DIAZEPAM 10 MILLIGRAM(S): 5 TABLET ORAL at 07:17

## 2024-11-17 RX ADMIN — DIAZEPAM 10 MILLIGRAM(S): 5 TABLET ORAL at 21:41

## 2024-11-17 RX ADMIN — BACLOFEN 10 MILLIGRAM(S): 10 INJECTION INTRATHECAL at 18:53

## 2024-11-18 VITALS
RESPIRATION RATE: 18 BRPM | SYSTOLIC BLOOD PRESSURE: 145 MMHG | TEMPERATURE: 98 F | HEART RATE: 100 BPM | OXYGEN SATURATION: 99 % | DIASTOLIC BLOOD PRESSURE: 98 MMHG

## 2024-11-18 PROCEDURE — 81001 URINALYSIS AUTO W/SCOPE: CPT

## 2024-11-18 PROCEDURE — 84702 CHORIONIC GONADOTROPIN TEST: CPT

## 2024-11-18 PROCEDURE — G0378: CPT

## 2024-11-18 PROCEDURE — 36415 COLL VENOUS BLD VENIPUNCTURE: CPT

## 2024-11-18 PROCEDURE — 71046 X-RAY EXAM CHEST 2 VIEWS: CPT

## 2024-11-18 PROCEDURE — 80307 DRUG TEST PRSMV CHEM ANLYZR: CPT

## 2024-11-18 PROCEDURE — 87635 SARS-COV-2 COVID-19 AMP PRB: CPT

## 2024-11-18 PROCEDURE — 99233 SBSQ HOSP IP/OBS HIGH 50: CPT

## 2024-11-18 PROCEDURE — 99285 EMERGENCY DEPT VISIT HI MDM: CPT | Mod: 25

## 2024-11-18 PROCEDURE — 93005 ELECTROCARDIOGRAM TRACING: CPT

## 2024-11-18 PROCEDURE — 96372 THER/PROPH/DIAG INJ SC/IM: CPT

## 2024-11-18 PROCEDURE — 85025 COMPLETE CBC W/AUTO DIFF WBC: CPT

## 2024-11-18 PROCEDURE — 99213 OFFICE O/P EST LOW 20 MIN: CPT

## 2024-11-18 PROCEDURE — 80053 COMPREHEN METABOLIC PANEL: CPT

## 2024-11-18 PROCEDURE — 87637 SARSCOV2&INF A&B&RSV AMP PRB: CPT

## 2024-11-18 RX ADMIN — BACLOFEN 10 MILLIGRAM(S): 10 INJECTION INTRATHECAL at 06:25

## 2024-11-18 RX ADMIN — DIAZEPAM 10 MILLIGRAM(S): 5 TABLET ORAL at 06:25

## 2024-11-18 RX ADMIN — DIAZEPAM 10 MILLIGRAM(S): 5 TABLET ORAL at 14:02

## 2025-02-22 ENCOUNTER — EMERGENCY (EMERGENCY)
Facility: HOSPITAL | Age: 40
LOS: 1 days | Discharge: OTHER INSTITUTION | End: 2025-02-22
Attending: STUDENT IN AN ORGANIZED HEALTH CARE EDUCATION/TRAINING PROGRAM
Payer: MEDICARE

## 2025-02-22 VITALS
DIASTOLIC BLOOD PRESSURE: 73 MMHG | WEIGHT: 115.08 LBS | OXYGEN SATURATION: 99 % | RESPIRATION RATE: 18 BRPM | SYSTOLIC BLOOD PRESSURE: 124 MMHG | TEMPERATURE: 98 F | HEART RATE: 107 BPM

## 2025-02-22 DIAGNOSIS — Z98.890 OTHER SPECIFIED POSTPROCEDURAL STATES: Chronic | ICD-10-CM

## 2025-02-22 DIAGNOSIS — Z98.1 ARTHRODESIS STATUS: Chronic | ICD-10-CM

## 2025-02-22 LAB
ALBUMIN SERPL ELPH-MCNC: 4.6 G/DL — SIGNIFICANT CHANGE UP (ref 3.3–5.2)
ALP SERPL-CCNC: 37 U/L — LOW (ref 40–120)
ALT FLD-CCNC: 14 U/L — SIGNIFICANT CHANGE UP
AMPHET UR-MCNC: POSITIVE
ANION GAP SERPL CALC-SCNC: 14 MMOL/L — SIGNIFICANT CHANGE UP (ref 5–17)
APAP SERPL-MCNC: <3 UG/ML — LOW (ref 10–26)
APPEARANCE UR: CLEAR — SIGNIFICANT CHANGE UP
AST SERPL-CCNC: 25 U/L — SIGNIFICANT CHANGE UP
BACTERIA # UR AUTO: NEGATIVE /HPF — SIGNIFICANT CHANGE UP
BARBITURATES UR SCN-MCNC: NEGATIVE — SIGNIFICANT CHANGE UP
BASOPHILS # BLD AUTO: 0.03 K/UL — SIGNIFICANT CHANGE UP (ref 0–0.2)
BASOPHILS NFR BLD AUTO: 0.7 % — SIGNIFICANT CHANGE UP (ref 0–2)
BENZODIAZ UR-MCNC: POSITIVE
BILIRUB SERPL-MCNC: 0.3 MG/DL — LOW (ref 0.4–2)
BILIRUB UR-MCNC: NEGATIVE — SIGNIFICANT CHANGE UP
BUN SERPL-MCNC: 12.6 MG/DL — SIGNIFICANT CHANGE UP (ref 8–20)
CALCIUM SERPL-MCNC: 8.8 MG/DL — SIGNIFICANT CHANGE UP (ref 8.4–10.5)
CHLORIDE SERPL-SCNC: 103 MMOL/L — SIGNIFICANT CHANGE UP (ref 96–108)
CO2 SERPL-SCNC: 24 MMOL/L — SIGNIFICANT CHANGE UP (ref 22–29)
COCAINE METAB.OTHER UR-MCNC: NEGATIVE — SIGNIFICANT CHANGE UP
COLOR SPEC: YELLOW — SIGNIFICANT CHANGE UP
CREAT SERPL-MCNC: 0.84 MG/DL — SIGNIFICANT CHANGE UP (ref 0.5–1.3)
DIFF PNL FLD: ABNORMAL
EGFR: 92 ML/MIN/1.73M2 — SIGNIFICANT CHANGE UP
EGFR: 92 ML/MIN/1.73M2 — SIGNIFICANT CHANGE UP
EOSINOPHIL # BLD AUTO: 0.13 K/UL — SIGNIFICANT CHANGE UP (ref 0–0.5)
EOSINOPHIL NFR BLD AUTO: 2.9 % — SIGNIFICANT CHANGE UP (ref 0–6)
ETHANOL SERPL-MCNC: <10 MG/DL — SIGNIFICANT CHANGE UP (ref 0–9)
FENTANYL UR QL SCN: NEGATIVE — SIGNIFICANT CHANGE UP
FLUAV AG NPH QL: SIGNIFICANT CHANGE UP
FLUBV AG NPH QL: SIGNIFICANT CHANGE UP
GLUCOSE SERPL-MCNC: 94 MG/DL — SIGNIFICANT CHANGE UP (ref 70–99)
GLUCOSE UR QL: NEGATIVE MG/DL — SIGNIFICANT CHANGE UP
HCG UR QL: NEGATIVE — SIGNIFICANT CHANGE UP
HCT VFR BLD CALC: 37.7 % — SIGNIFICANT CHANGE UP (ref 34.5–45)
HGB BLD-MCNC: 12.1 G/DL — SIGNIFICANT CHANGE UP (ref 11.5–15.5)
IMM GRANULOCYTES # BLD AUTO: 0.01 K/UL — SIGNIFICANT CHANGE UP (ref 0–0.07)
IMM GRANULOCYTES NFR BLD AUTO: 0.2 % — SIGNIFICANT CHANGE UP (ref 0–0.9)
KETONES UR-MCNC: NEGATIVE MG/DL — SIGNIFICANT CHANGE UP
LEUKOCYTE ESTERASE UR-ACNC: NEGATIVE — SIGNIFICANT CHANGE UP
LYMPHOCYTES # BLD AUTO: 1.73 K/UL — SIGNIFICANT CHANGE UP (ref 1–3.3)
LYMPHOCYTES NFR BLD AUTO: 38.4 % — SIGNIFICANT CHANGE UP (ref 13–44)
MCHC RBC-ENTMCNC: 28.9 PG — SIGNIFICANT CHANGE UP (ref 27–34)
MCHC RBC-ENTMCNC: 32.1 G/DL — SIGNIFICANT CHANGE UP (ref 32–36)
MCV RBC AUTO: 90.2 FL — SIGNIFICANT CHANGE UP (ref 80–100)
METHADONE UR-MCNC: NEGATIVE — SIGNIFICANT CHANGE UP
MONOCYTES # BLD AUTO: 0.42 K/UL — SIGNIFICANT CHANGE UP (ref 0–0.9)
MONOCYTES NFR BLD AUTO: 9.3 % — SIGNIFICANT CHANGE UP (ref 2–14)
NEUTROPHILS # BLD AUTO: 2.18 K/UL — SIGNIFICANT CHANGE UP (ref 1.8–7.4)
NEUTROPHILS NFR BLD AUTO: 48.5 % — SIGNIFICANT CHANGE UP (ref 43–77)
NITRITE UR-MCNC: NEGATIVE — SIGNIFICANT CHANGE UP
NRBC # BLD AUTO: 0 K/UL — SIGNIFICANT CHANGE UP (ref 0–0)
NRBC # FLD: 0 K/UL — SIGNIFICANT CHANGE UP (ref 0–0)
NRBC BLD AUTO-RTO: 0 /100 WBCS — SIGNIFICANT CHANGE UP (ref 0–0)
OPIATES UR-MCNC: NEGATIVE — SIGNIFICANT CHANGE UP
PCP SPEC-MCNC: SIGNIFICANT CHANGE UP
PCP UR-MCNC: NEGATIVE — SIGNIFICANT CHANGE UP
PH UR: 6.5 — SIGNIFICANT CHANGE UP (ref 5–8)
PLATELET # BLD AUTO: 231 K/UL — SIGNIFICANT CHANGE UP (ref 150–400)
PMV BLD: 8.5 FL — SIGNIFICANT CHANGE UP (ref 7–13)
POTASSIUM SERPL-MCNC: 3.9 MMOL/L — SIGNIFICANT CHANGE UP (ref 3.5–5.3)
POTASSIUM SERPL-SCNC: 3.9 MMOL/L — SIGNIFICANT CHANGE UP (ref 3.5–5.3)
PROT SERPL-MCNC: 6.8 G/DL — SIGNIFICANT CHANGE UP (ref 6.6–8.7)
PROT UR-MCNC: NEGATIVE MG/DL — SIGNIFICANT CHANGE UP
RBC # BLD: 4.18 M/UL — SIGNIFICANT CHANGE UP (ref 3.8–5.2)
RBC # FLD: 13.2 % — SIGNIFICANT CHANGE UP (ref 10.3–14.5)
RBC CASTS # UR COMP ASSIST: 1 /HPF — SIGNIFICANT CHANGE UP (ref 0–4)
RSV RNA NPH QL NAA+NON-PROBE: SIGNIFICANT CHANGE UP
SARS-COV-2 RNA SPEC QL NAA+PROBE: SIGNIFICANT CHANGE UP
SODIUM SERPL-SCNC: 141 MMOL/L — SIGNIFICANT CHANGE UP (ref 135–145)
SP GR SPEC: 1.01 — SIGNIFICANT CHANGE UP (ref 1–1.03)
SQUAMOUS # UR AUTO: 1 /HPF — SIGNIFICANT CHANGE UP (ref 0–5)
THC UR QL: POSITIVE
UROBILINOGEN FLD QL: 0.2 MG/DL — SIGNIFICANT CHANGE UP (ref 0.2–1)
WBC # BLD: 4.5 K/UL — SIGNIFICANT CHANGE UP (ref 3.8–10.5)
WBC # FLD AUTO: 4.5 K/UL — SIGNIFICANT CHANGE UP (ref 3.8–10.5)
WBC UR QL: 0 /HPF — SIGNIFICANT CHANGE UP (ref 0–5)

## 2025-02-22 PROCEDURE — 90792 PSYCH DIAG EVAL W/MED SRVCS: CPT

## 2025-02-22 PROCEDURE — 99285 EMERGENCY DEPT VISIT HI MDM: CPT

## 2025-02-22 PROCEDURE — 93010 ELECTROCARDIOGRAM REPORT: CPT

## 2025-02-22 RX ORDER — ALBUTEROL SULFATE 2.5 MG/3ML
2 VIAL, NEBULIZER (ML) INHALATION EVERY 6 HOURS
Refills: 0 | Status: DISCONTINUED | OUTPATIENT
Start: 2025-02-22 | End: 2025-03-02

## 2025-02-22 RX ORDER — LORAZEPAM 4 MG/ML
2 VIAL (ML) INJECTION ONCE
Refills: 0 | Status: DISCONTINUED | OUTPATIENT
Start: 2025-02-22 | End: 2025-02-22

## 2025-02-22 RX ORDER — DIAZEPAM 5 MG/1
10 TABLET ORAL THREE TIMES A DAY
Refills: 0 | Status: DISCONTINUED | OUTPATIENT
Start: 2025-02-22 | End: 2025-02-22

## 2025-02-22 RX ADMIN — Medication 2 PUFF(S): at 20:38

## 2025-02-22 RX ADMIN — DIAZEPAM 10 MILLIGRAM(S): 5 TABLET ORAL at 18:37

## 2025-02-22 RX ADMIN — Medication 2 MILLIGRAM(S): at 20:39

## 2025-02-23 PROCEDURE — 99214 OFFICE O/P EST MOD 30 MIN: CPT

## 2025-02-23 PROCEDURE — 99223 1ST HOSP IP/OBS HIGH 75: CPT

## 2025-02-23 RX ORDER — LORAZEPAM 4 MG/ML
2 VIAL (ML) INJECTION ONCE
Refills: 0 | Status: DISCONTINUED | OUTPATIENT
Start: 2025-02-23 | End: 2025-02-23

## 2025-02-23 RX ORDER — DIAZEPAM 5 MG/1
10 TABLET ORAL THREE TIMES A DAY
Refills: 0 | Status: COMPLETED | OUTPATIENT
Start: 2025-02-23 | End: 2025-03-02

## 2025-02-23 RX ADMIN — DIAZEPAM 10 MILLIGRAM(S): 5 TABLET ORAL at 20:40

## 2025-02-23 RX ADMIN — Medication 2 MILLIGRAM(S): at 21:43

## 2025-02-23 RX ADMIN — DIAZEPAM 10 MILLIGRAM(S): 5 TABLET ORAL at 13:45

## 2025-02-24 VITALS
TEMPERATURE: 98 F | OXYGEN SATURATION: 100 % | DIASTOLIC BLOOD PRESSURE: 56 MMHG | SYSTOLIC BLOOD PRESSURE: 104 MMHG | RESPIRATION RATE: 18 BRPM | HEART RATE: 87 BPM

## 2025-02-24 PROCEDURE — 99233 SBSQ HOSP IP/OBS HIGH 50: CPT

## 2025-02-24 PROCEDURE — 80053 COMPREHEN METABOLIC PANEL: CPT

## 2025-02-24 PROCEDURE — 80307 DRUG TEST PRSMV CHEM ANLYZR: CPT

## 2025-02-24 PROCEDURE — 99213 OFFICE O/P EST LOW 20 MIN: CPT

## 2025-02-24 PROCEDURE — G0378: CPT

## 2025-02-24 PROCEDURE — 99285 EMERGENCY DEPT VISIT HI MDM: CPT | Mod: 25

## 2025-02-24 PROCEDURE — 36415 COLL VENOUS BLD VENIPUNCTURE: CPT

## 2025-02-24 PROCEDURE — 94640 AIRWAY INHALATION TREATMENT: CPT

## 2025-02-24 PROCEDURE — 85025 COMPLETE CBC W/AUTO DIFF WBC: CPT

## 2025-02-24 PROCEDURE — 96372 THER/PROPH/DIAG INJ SC/IM: CPT

## 2025-02-24 PROCEDURE — 81025 URINE PREGNANCY TEST: CPT

## 2025-02-24 PROCEDURE — 93005 ELECTROCARDIOGRAM TRACING: CPT

## 2025-02-24 PROCEDURE — 81001 URINALYSIS AUTO W/SCOPE: CPT

## 2025-02-24 PROCEDURE — 87637 SARSCOV2&INF A&B&RSV AMP PRB: CPT

## 2025-02-24 RX ORDER — HALOPERIDOL 10 MG/1
5 TABLET ORAL ONCE
Refills: 0 | Status: DISCONTINUED | OUTPATIENT
Start: 2025-02-24 | End: 2025-03-02

## 2025-02-24 RX ORDER — LORAZEPAM 4 MG/ML
2 VIAL (ML) INJECTION ONCE
Refills: 0 | Status: DISCONTINUED | OUTPATIENT
Start: 2025-02-24 | End: 2025-03-02

## 2025-02-24 RX ADMIN — DIAZEPAM 10 MILLIGRAM(S): 5 TABLET ORAL at 13:13

## 2025-04-14 ENCOUNTER — NON-APPOINTMENT (OUTPATIENT)
Age: 40
End: 2025-04-14

## 2025-05-29 ENCOUNTER — NON-APPOINTMENT (OUTPATIENT)
Age: 40
End: 2025-05-29